# Patient Record
Sex: FEMALE | Employment: FULL TIME
[De-identification: names, ages, dates, MRNs, and addresses within clinical notes are randomized per-mention and may not be internally consistent; named-entity substitution may affect disease eponyms.]

---

## 2017-09-24 ENCOUNTER — HEALTH MAINTENANCE LETTER (OUTPATIENT)
Age: 32
End: 2017-09-24

## 2023-03-05 ASSESSMENT — ANXIETY QUESTIONNAIRES
2. NOT BEING ABLE TO STOP OR CONTROL WORRYING: MORE THAN HALF THE DAYS
8. IF YOU CHECKED OFF ANY PROBLEMS, HOW DIFFICULT HAVE THESE MADE IT FOR YOU TO DO YOUR WORK, TAKE CARE OF THINGS AT HOME, OR GET ALONG WITH OTHER PEOPLE?: VERY DIFFICULT
GAD7 TOTAL SCORE: 14
6. BECOMING EASILY ANNOYED OR IRRITABLE: MORE THAN HALF THE DAYS
5. BEING SO RESTLESS THAT IT IS HARD TO SIT STILL: SEVERAL DAYS
1. FEELING NERVOUS, ANXIOUS, OR ON EDGE: NEARLY EVERY DAY
4. TROUBLE RELAXING: MORE THAN HALF THE DAYS
7. FEELING AFRAID AS IF SOMETHING AWFUL MIGHT HAPPEN: MORE THAN HALF THE DAYS
GAD7 TOTAL SCORE: 14
IF YOU CHECKED OFF ANY PROBLEMS ON THIS QUESTIONNAIRE, HOW DIFFICULT HAVE THESE PROBLEMS MADE IT FOR YOU TO DO YOUR WORK, TAKE CARE OF THINGS AT HOME, OR GET ALONG WITH OTHER PEOPLE: VERY DIFFICULT
GAD7 TOTAL SCORE: 14
7. FEELING AFRAID AS IF SOMETHING AWFUL MIGHT HAPPEN: MORE THAN HALF THE DAYS
3. WORRYING TOO MUCH ABOUT DIFFERENT THINGS: MORE THAN HALF THE DAYS

## 2023-03-05 ASSESSMENT — PATIENT HEALTH QUESTIONNAIRE - PHQ9
SUM OF ALL RESPONSES TO PHQ QUESTIONS 1-9: 8
10. IF YOU CHECKED OFF ANY PROBLEMS, HOW DIFFICULT HAVE THESE PROBLEMS MADE IT FOR YOU TO DO YOUR WORK, TAKE CARE OF THINGS AT HOME, OR GET ALONG WITH OTHER PEOPLE: VERY DIFFICULT
SUM OF ALL RESPONSES TO PHQ QUESTIONS 1-9: 8

## 2023-03-06 ENCOUNTER — OFFICE VISIT (OUTPATIENT)
Dept: FAMILY MEDICINE | Facility: CLINIC | Age: 38
End: 2023-03-06
Payer: COMMERCIAL

## 2023-03-06 VITALS
WEIGHT: 158 LBS | OXYGEN SATURATION: 99 % | HEART RATE: 68 BPM | SYSTOLIC BLOOD PRESSURE: 139 MMHG | BODY MASS INDEX: 28 KG/M2 | TEMPERATURE: 97.9 F | HEIGHT: 63 IN | RESPIRATION RATE: 18 BRPM | DIASTOLIC BLOOD PRESSURE: 87 MMHG

## 2023-03-06 DIAGNOSIS — E55.9 VITAMIN D INSUFFICIENCY: ICD-10-CM

## 2023-03-06 DIAGNOSIS — F41.9 ANXIETY: Primary | ICD-10-CM

## 2023-03-06 DIAGNOSIS — F32.A DEPRESSION, UNSPECIFIED DEPRESSION TYPE: ICD-10-CM

## 2023-03-06 LAB — TSH SERPL DL<=0.005 MIU/L-ACNC: 3.03 MU/L (ref 0.4–4)

## 2023-03-06 PROCEDURE — 84443 ASSAY THYROID STIM HORMONE: CPT | Performed by: PHYSICIAN ASSISTANT

## 2023-03-06 PROCEDURE — 82306 VITAMIN D 25 HYDROXY: CPT | Performed by: PHYSICIAN ASSISTANT

## 2023-03-06 PROCEDURE — 36415 COLL VENOUS BLD VENIPUNCTURE: CPT | Performed by: PHYSICIAN ASSISTANT

## 2023-03-06 PROCEDURE — 99204 OFFICE O/P NEW MOD 45 MIN: CPT | Performed by: PHYSICIAN ASSISTANT

## 2023-03-06 RX ORDER — HYDROXYZINE HYDROCHLORIDE 25 MG/1
25-50 TABLET, FILM COATED ORAL 3 TIMES DAILY PRN
COMMUNITY
Start: 2023-02-16 | End: 2023-03-24

## 2023-03-06 RX ORDER — LEVONORGESTREL/ETHIN.ESTRADIOL 0.1-0.02MG
1 TABLET ORAL DAILY
COMMUNITY
Start: 2022-02-03

## 2023-03-06 RX ORDER — SERTRALINE HYDROCHLORIDE 100 MG/1
100 TABLET, FILM COATED ORAL DAILY
Qty: 30 TABLET | Refills: 0 | Status: SHIPPED | OUTPATIENT
Start: 2023-03-06 | End: 2023-04-07

## 2023-03-06 ASSESSMENT — ANXIETY QUESTIONNAIRES: GAD7 TOTAL SCORE: 14

## 2023-03-06 ASSESSMENT — ENCOUNTER SYMPTOMS: NERVOUS/ANXIOUS: 1

## 2023-03-06 ASSESSMENT — PAIN SCALES - GENERAL: PAINLEVEL: NO PAIN (0)

## 2023-03-06 ASSESSMENT — PATIENT HEALTH QUESTIONNAIRE - PHQ9
10. IF YOU CHECKED OFF ANY PROBLEMS, HOW DIFFICULT HAVE THESE PROBLEMS MADE IT FOR YOU TO DO YOUR WORK, TAKE CARE OF THINGS AT HOME, OR GET ALONG WITH OTHER PEOPLE: VERY DIFFICULT
SUM OF ALL RESPONSES TO PHQ QUESTIONS 1-9: 8

## 2023-03-06 NOTE — PROGRESS NOTES
Assessment & Plan     (F41.9) Anxiety  (primary encounter diagnosis)  Comment: Patient with history of anxiety.  Has been taking sertraline 50 mg over the last few weeks.  No significant nausea or vomiting associated with this.  Discussed with the patient increasing dose to 100 mg.  Patient was amenable to this.  In chart review it does appear that there is a history of vitamin D deficiency but this has not been assessed recently.  Discussed with patient screening labs for vitamin D deficiency.  Last thyroid study was 3 months ago was normal, however, the patient has been having worsening symptoms since that time.  Discussed with patient recheck of thyroid study.  Patient was amenable to this plan.  Patient does speak with a therapist.  I did provide her with a card for therapist here at the [facility].  She is going to continue with increasing dose of sertraline and follow-up in 30 days for repeat discussion of symptoms.  Plan: Vitamin D Deficiency, sertraline (ZOLOFT) 100         MG tablet, TSH with free T4 reflex           (F32.A) Depression, unspecified depression type  Comment: History of depression.  Has been taking sertraline.  Depression does seem to correlate more with the worsening anxiety.  Denies any suicidal or homicidal ideation.  Continue to monitor symptoms.    Return in about 4 weeks (around 4/3/2023) for Follow up.    Bashir Bermudez PA-C  [facility]   Marlin is a 37 year old, presenting for the following health issues:  Anxiety (Pt said that she has been dealing with anxiety and panic attack for the last two weeks. /)      Anxiety    History of Present Illness       Mental Health Follow-up:  Patient presents to follow-up on Depression & Anxiety.Patient's depression since last visit has been:  Medium  The patient is not having other symptoms associated with depression.  Patient's anxiety since last visit has been:  Medium  The patient is having other  symptoms associated with anxiety.  Any significant life events: relationship concerns, financial concerns and health concerns  Patient is feeling anxious or having panic attacks.  Patient has no concerns about alcohol or drug use.    She eats 0-1 servings of fruits and vegetables daily.She consumes 1 sweetened beverage(s) daily.She exercises with enough effort to increase her heart rate 9 or less minutes per day.  She exercises with enough effort to increase her heart rate 3 or less days per week.   She is taking medications regularly.    Today's PHQ-9         PHQ-9 Total Score: 8    PHQ-9 Q9 Thoughts of better off dead/self-harm past 2 weeks :   Not at all    How difficult have these problems made it for you to do your work, take care of things at home, or get along with other people: Very difficult  Today's RUTH ANN-7 Score: 14     Starting roughly 6 months ago the patient began having issues with nausea.  This became more constant over the last few weeks.  Felt as though she is having a panic attack.  She was seen at [facility] emergency department.  She is also followed with gastroenterology.  Work-ups have included endoscopy, EKG, CT scans.  She feels at times as though she is short of breath needs to vomit.  With this has been having racing heart.  Started on hydroxyzine 25 mg 2-3 times per day.  This has improved her symptoms when having more severe flares.  Was also started on sertraline 50 mg once daily.  She has some depression related to the increased anxiety and does not feel as though she can go to work with this.  She has not had any thoughts of self-harm.  She has been having more issues with sleep over the last 2 months.  Troubles falling asleep as well as staying asleep.  Does speak with a therapist monthly.  Patient does not correlate symptoms to any new or significant triggers at work.  Patient does not voice any other new complaints or concerns         Review of Systems   Psychiatric/Behavioral: The  "patient is nervous/anxious.       Constitutional, HEENT, cardiovascular, pulmonary, gi and gu systems are negative, except as otherwise noted.      Objective    /87   Pulse 68   Temp 97.9  F (36.6  C) (Tympanic)   Resp 18   Ht 1.6 m (5' 3\")   Wt 71.7 kg (158 lb)   SpO2 99%   BMI 27.99 kg/m    Body mass index is 27.99 kg/m .  Physical Exam   GENERAL: healthy, alert and no distress  NECK: no adenopathy, no asymmetry, masses, or scars and thyroid normal to palpation  RESP: lungs clear to auscultation - no rales, rhonchi or wheezes  CV: regular rate and rhythm, normal S1 S2, no S3 or S4, no murmur, click or rub, no peripheral edema and peripheral pulses strong  ABDOMEN: soft, nontender, no hepatosplenomegaly, no masses and bowel sounds normal  MS: no gross musculoskeletal defects noted, no edema  SKIN: no suspicious lesions or rashes  NEURO: Normal strength and tone, mentation intact and speech normal  PSYCH: mentation appears normal, affect normal/bright          "

## 2023-03-07 LAB — DEPRECATED CALCIDIOL+CALCIFEROL SERPL-MC: 20 UG/L (ref 20–75)

## 2023-03-07 RX ORDER — VITAMIN B COMPLEX
1 TABLET ORAL DAILY
Qty: 90 TABLET | Refills: 1 | Status: SHIPPED | OUTPATIENT
Start: 2023-03-07 | End: 2023-09-03

## 2023-04-06 ASSESSMENT — ANXIETY QUESTIONNAIRES
3. WORRYING TOO MUCH ABOUT DIFFERENT THINGS: SEVERAL DAYS
IF YOU CHECKED OFF ANY PROBLEMS ON THIS QUESTIONNAIRE, HOW DIFFICULT HAVE THESE PROBLEMS MADE IT FOR YOU TO DO YOUR WORK, TAKE CARE OF THINGS AT HOME, OR GET ALONG WITH OTHER PEOPLE: NOT DIFFICULT AT ALL
6. BECOMING EASILY ANNOYED OR IRRITABLE: NOT AT ALL
1. FEELING NERVOUS, ANXIOUS, OR ON EDGE: SEVERAL DAYS
7. FEELING AFRAID AS IF SOMETHING AWFUL MIGHT HAPPEN: NOT AT ALL
8. IF YOU CHECKED OFF ANY PROBLEMS, HOW DIFFICULT HAVE THESE MADE IT FOR YOU TO DO YOUR WORK, TAKE CARE OF THINGS AT HOME, OR GET ALONG WITH OTHER PEOPLE?: NOT DIFFICULT AT ALL
GAD7 TOTAL SCORE: 3
5. BEING SO RESTLESS THAT IT IS HARD TO SIT STILL: NOT AT ALL
7. FEELING AFRAID AS IF SOMETHING AWFUL MIGHT HAPPEN: NOT AT ALL
4. TROUBLE RELAXING: NOT AT ALL
GAD7 TOTAL SCORE: 3
GAD7 TOTAL SCORE: 3
2. NOT BEING ABLE TO STOP OR CONTROL WORRYING: SEVERAL DAYS

## 2023-04-06 ASSESSMENT — PATIENT HEALTH QUESTIONNAIRE - PHQ9
SUM OF ALL RESPONSES TO PHQ QUESTIONS 1-9: 3
10. IF YOU CHECKED OFF ANY PROBLEMS, HOW DIFFICULT HAVE THESE PROBLEMS MADE IT FOR YOU TO DO YOUR WORK, TAKE CARE OF THINGS AT HOME, OR GET ALONG WITH OTHER PEOPLE: NOT DIFFICULT AT ALL
SUM OF ALL RESPONSES TO PHQ QUESTIONS 1-9: 3

## 2023-04-07 ENCOUNTER — OFFICE VISIT (OUTPATIENT)
Dept: FAMILY MEDICINE | Facility: CLINIC | Age: 38
End: 2023-04-07
Payer: COMMERCIAL

## 2023-04-07 VITALS
HEART RATE: 71 BPM | SYSTOLIC BLOOD PRESSURE: 137 MMHG | TEMPERATURE: 98.1 F | WEIGHT: 261.2 LBS | OXYGEN SATURATION: 97 % | DIASTOLIC BLOOD PRESSURE: 84 MMHG | RESPIRATION RATE: 16 BRPM | BODY MASS INDEX: 46.28 KG/M2 | HEIGHT: 63 IN

## 2023-04-07 DIAGNOSIS — F41.9 ANXIETY: Primary | ICD-10-CM

## 2023-04-07 PROBLEM — E66.01 MORBID OBESITY (H): Status: ACTIVE | Noted: 2023-04-07

## 2023-04-07 PROCEDURE — 99213 OFFICE O/P EST LOW 20 MIN: CPT | Performed by: PHYSICIAN ASSISTANT

## 2023-04-07 RX ORDER — HYDROXYZINE HYDROCHLORIDE 25 MG/1
25-50 TABLET, FILM COATED ORAL 3 TIMES DAILY PRN
Qty: 60 TABLET | Refills: 0 | Status: SHIPPED | OUTPATIENT
Start: 2023-04-07 | End: 2023-08-18

## 2023-04-07 RX ORDER — SERTRALINE HYDROCHLORIDE 100 MG/1
100 TABLET, FILM COATED ORAL DAILY
Qty: 90 TABLET | Refills: 1 | Status: SHIPPED | OUTPATIENT
Start: 2023-04-07 | End: 2023-08-18

## 2023-04-07 ASSESSMENT — PAIN SCALES - GENERAL: PAINLEVEL: MILD PAIN (2)

## 2023-04-07 ASSESSMENT — PATIENT HEALTH QUESTIONNAIRE - PHQ9
SUM OF ALL RESPONSES TO PHQ QUESTIONS 1-9: 3
10. IF YOU CHECKED OFF ANY PROBLEMS, HOW DIFFICULT HAVE THESE PROBLEMS MADE IT FOR YOU TO DO YOUR WORK, TAKE CARE OF THINGS AT HOME, OR GET ALONG WITH OTHER PEOPLE: NOT DIFFICULT AT ALL

## 2023-04-07 ASSESSMENT — ANXIETY QUESTIONNAIRES: GAD7 TOTAL SCORE: 3

## 2023-04-07 NOTE — PROGRESS NOTES
Assessment & Plan     (F41.9) Anxiety  (primary encounter diagnosis)  Comment: Patient presents for recheck of medications.  Had increase of sertraline from 50 mg to 100 mg.  Mood has stabilized over the last week.  Not using hydroxyzine daily as she had been previously.  Continues to follow with therapist.  No thoughts of self-harm.  Patient feels that this dose has been holding her better for anxiety.  Discussed with the patient starting exercise regimen that may also be beneficial for mental health.  She is currently on vitamin D supplements.  Continue to take these and plan to recheck vitamin D level in the next few months.  Should she have any worsening or new concerns she will be seen again  Plan: sertraline (ZOLOFT) 100 MG tablet, hydrOXYzine         (ATARAX) 25 MG tablet           Bashir Bermudez PA-C  [facility]   Marlin is a 37 year old, presenting for the following health issues:  Follow Up (F/U from last visit. )         View : No data to display.              History of Present Illness       Mental Health Follow-up:  Patient presents to follow-up on Depression & Anxiety.Patient's depression since last visit has been:  Better  The patient is not having other symptoms associated with depression.  Patient's anxiety since last visit has been:  Better  The patient is not having other symptoms associated with anxiety.  Any significant life events: relationship concerns and financial concerns  Patient is feeling anxious or having panic attacks.  Patient has no concerns about alcohol or drug use.    She eats 0-1 servings of fruits and vegetables daily.She consumes 0 sweetened beverage(s) daily.She exercises with enough effort to increase her heart rate 9 or less minutes per day.  She exercises with enough effort to increase her heart rate 3 or less days per week.   She is taking medications regularly.    Today's PHQ-9         PHQ-9 Total Score: 3    PHQ-9 Q9 Thoughts  "of better off dead/self-harm past 2 weeks :   Not at all    How difficult have these problems made it for you to do your work, take care of things at home, or get along with other people: Not difficult at all  Today's RUTH ANN-7 Score: 3    1 month ago patient had increase of sertraline from 50 mg to 100 mg.  Over the last week she has had significant improvement in her overall anxiety.  She has been using hydroxyzine anywhere from 1-3 times a day.  Has not used it over the last week.  Feels her mood is overall more stable.  No thoughts of self-harm.  Continues to see therapist through relationship therapy center.  Initially therapy there had started out as couples therapy and became more individualized.  No current exercise regimen.  Has not been working out regularly.        Review of Systems   Constitutional, HEENT, cardiovascular, pulmonary, gi and gu systems are negative, except as otherwise noted.      Objective    /84   Pulse 71   Temp 98.1  F (36.7  C) (Tympanic)   Resp 16   Ht 1.6 m (5' 3\")   Wt 118.5 kg (261 lb 3.2 oz)   SpO2 97%   BMI 46.27 kg/m    Body mass index is 46.27 kg/m .  Physical Exam   GENERAL: healthy, alert and no distress  EYES: Eyes grossly normal to inspection, PERRL and conjunctivae and sclerae normal  HENT: normal cephalic/atraumatic, nose and mouth without ulcers or lesions, oropharynx clear and oral mucous membranes moist  RESP: lungs clear to auscultation - no rales, rhonchi or wheezes  CV: regular rate and rhythm, normal S1 S2, no S3 or S4, no murmur, click or rub,    MS: no gross musculoskeletal defects noted, no edema  PSYCH: mentation appears normal, affect normal/bright          "

## 2023-04-23 ENCOUNTER — HEALTH MAINTENANCE LETTER (OUTPATIENT)
Age: 38
End: 2023-04-23

## 2023-08-18 ENCOUNTER — E-VISIT (OUTPATIENT)
Dept: FAMILY MEDICINE | Facility: CLINIC | Age: 38
End: 2023-08-18
Payer: COMMERCIAL

## 2023-08-18 DIAGNOSIS — F41.9 ANXIETY: ICD-10-CM

## 2023-08-18 PROCEDURE — 99421 OL DIG E/M SVC 5-10 MIN: CPT | Performed by: PHYSICIAN ASSISTANT

## 2023-08-18 RX ORDER — HYDROXYZINE HYDROCHLORIDE 25 MG/1
25-50 TABLET, FILM COATED ORAL 3 TIMES DAILY PRN
Qty: 60 TABLET | Refills: 0 | Status: SHIPPED | OUTPATIENT
Start: 2023-08-18 | End: 2023-11-17

## 2023-08-18 RX ORDER — SERTRALINE HYDROCHLORIDE 100 MG/1
100 TABLET, FILM COATED ORAL DAILY
Qty: 90 TABLET | Refills: 0 | Status: SHIPPED | OUTPATIENT
Start: 2023-08-18 | End: 2023-11-17

## 2023-08-18 NOTE — TELEPHONE ENCOUNTER
Refill of sertraline and hydroxyzine.   Symptoms have been stable.   Provider E-Visit time total (minutes): 7   Bashir Bermudez PA-C

## 2023-08-18 NOTE — PATIENT INSTRUCTIONS
Thank you for choosing us for your care. I have placed an order for a prescription so that you can start treatment. View your full visit summary for details by clicking on the link below. Your pharmacist will able to address any questions you may have about the medication.     If you're not feeling better within 5-7 days, please schedule an appointment.  You can schedule an appointment right here in [facility], or call [phone]  If the visit is for the same symptoms as your eVisit, we'll refund the cost of your eVisit if seen within seven days.

## 2023-11-17 ENCOUNTER — VIRTUAL VISIT (OUTPATIENT)
Dept: FAMILY MEDICINE | Facility: CLINIC | Age: 38
End: 2023-11-17
Payer: COMMERCIAL

## 2023-11-17 DIAGNOSIS — F41.9 ANXIETY: ICD-10-CM

## 2023-11-17 PROCEDURE — 99213 OFFICE O/P EST LOW 20 MIN: CPT | Mod: 95 | Performed by: PHYSICIAN ASSISTANT

## 2023-11-17 PROCEDURE — 96127 BRIEF EMOTIONAL/BEHAV ASSMT: CPT | Mod: 95 | Performed by: PHYSICIAN ASSISTANT

## 2023-11-17 RX ORDER — HYDROXYZINE HYDROCHLORIDE 25 MG/1
25-50 TABLET, FILM COATED ORAL 3 TIMES DAILY PRN
Qty: 60 TABLET | Refills: 3 | Status: SHIPPED | OUTPATIENT
Start: 2023-11-17

## 2023-11-17 RX ORDER — SERTRALINE HYDROCHLORIDE 100 MG/1
100 TABLET, FILM COATED ORAL DAILY
Qty: 90 TABLET | Refills: 3 | Status: SHIPPED | OUTPATIENT
Start: 2023-11-17

## 2023-11-17 ASSESSMENT — ANXIETY QUESTIONNAIRES
GAD7 TOTAL SCORE: 1
5. BEING SO RESTLESS THAT IT IS HARD TO SIT STILL: NOT AT ALL
7. FEELING AFRAID AS IF SOMETHING AWFUL MIGHT HAPPEN: NOT AT ALL
3. WORRYING TOO MUCH ABOUT DIFFERENT THINGS: NOT AT ALL
1. FEELING NERVOUS, ANXIOUS, OR ON EDGE: NOT AT ALL
4. TROUBLE RELAXING: NOT AT ALL
2. NOT BEING ABLE TO STOP OR CONTROL WORRYING: SEVERAL DAYS
6. BECOMING EASILY ANNOYED OR IRRITABLE: NOT AT ALL
IF YOU CHECKED OFF ANY PROBLEMS ON THIS QUESTIONNAIRE, HOW DIFFICULT HAVE THESE PROBLEMS MADE IT FOR YOU TO DO YOUR WORK, TAKE CARE OF THINGS AT HOME, OR GET ALONG WITH OTHER PEOPLE: NOT DIFFICULT AT ALL
GAD7 TOTAL SCORE: 1

## 2023-11-17 ASSESSMENT — PATIENT HEALTH QUESTIONNAIRE - PHQ9: SUM OF ALL RESPONSES TO PHQ QUESTIONS 1-9: 0

## 2023-11-17 NOTE — PROGRESS NOTES
"    Instructions Relayed to Patient by Virtual Roomer:     Patient is active on [employer]:   Relayed following to patient: \"It looks like you are active on [employer], are you able to join the visit this way? If not, do you need us to send you a link now or would you like your provider to send a link via text or email when they are ready to initiate the visit?\"    Reminded patient to ensure they were logged on to virtual visit by arrival time listed. Documented in appointment notes if patient had flexibility to initiate visit sooner than arrival time. If pediatric virtual visit, ensured pediatric patient along with parent/guardian will be present for video visit.     Patient offered the website [email or web address] and/or phone number to [employer] Help line: [phone]   Marlin is a 38 year old who is being evaluated via a billable video visit.      How would you like to obtain your AVS? [employer]  If the video visit is dropped, the invitation should be resent by: Text to cell phone: [phone]  Will anyone else be joining your video visit? No          Assessment & Plan     (F41.9) Anxiety  Comment: Patient presents for refills of medications.  On sertraline 100 mg as well as hydroxyzine as needed.  Overall symptoms have been stable.  Denies any alcohol or other substance abuse.  Would like refills of medications.  Discussed if she has any worsening or new concerns to be seen again.  Discussed potentially establishing with a new therapist.  Plan: hydrOXYzine (ATARAX) 25 MG tablet, sertraline         (ZOLOFT) 100 MG tablet                     BMI:   Estimated body mass index is 46.27 kg/m  as calculated from the following:    Height as of 4/7/23: 1.6 m (5' 3\").    Weight as of 4/7/23: 118.5 kg (261 lb 3.2 oz).           Bashir Bermudez PA-C  [facility]   Marlin is a 38 year old, presenting for the following health issues:  No chief complaint on file.      History " of Present Illness       Mental Health Follow-up:  Patient presents to follow-up on Depression & Anxiety.Patient's depression since last visit has been:  Good  The patient is not having other symptoms associated with depression.  Patient's anxiety since last visit has been:  Better  The patient is not having other symptoms associated with anxiety.  Any significant life events: relationship concerns and financial concerns  Patient is not feeling anxious or having panic attacks.  Patient has no concerns about alcohol or drug use.    She eats 0-1 servings of fruits and vegetables daily.She consumes 1 sweetened beverage(s) daily.She exercises with enough effort to increase her heart rate 9 or less minutes per day.  She exercises with enough effort to increase her heart rate 3 or less days per week.   She is taking medications regularly.       Patient has been feeling much improved with the 100 mg sertraline.  No thoughts of self-harm.  Has been holding her symptoms well.  Denies any thoughts of self-harm.  Previously had been following with a therapist, however, this therapist had a change in her schedule she has not seen them for the last few months.  Has been using hydroxyzine once every few weeks.  Wise patient has been doing well.    Review of Systems   Constitutional, HEENT, cardiovascular, pulmonary, gi and gu systems are negative, except as otherwise noted.      Objective           Vitals:  No vitals were obtained today due to virtual visit.    Physical Exam   GENERAL: Healthy, alert and no distress  EYES: Eyes grossly normal to inspection.  No discharge or erythema, or obvious scleral/conjunctival abnormalities.  RESP: No audible wheeze, cough, or visible cyanosis.  No visible retractions or increased work of breathing.    SKIN: Visible skin clear. No significant rash, abnormal pigmentation or lesions.  NEURO: Cranial nerves grossly intact.  Mentation and speech appropriate for age.  PSYCH: Mentation appears  normal, affect normal/bright, judgement and insight intact, normal speech and appearance well-groomed.                Video-Visit Details    Type of service:  Video Visit     Originating Location (pt. Location): Home    Distant Location (provider location):  On-site  Platform used for Video Visit: Cari

## 2024-01-18 ENCOUNTER — OFFICE VISIT (OUTPATIENT)
Dept: FAMILY MEDICINE | Facility: CLINIC | Age: 39
End: 2024-01-18
Payer: COMMERCIAL

## 2024-01-18 VITALS
BODY MASS INDEX: 48.9 KG/M2 | DIASTOLIC BLOOD PRESSURE: 88 MMHG | SYSTOLIC BLOOD PRESSURE: 139 MMHG | WEIGHT: 276 LBS | HEIGHT: 63 IN | RESPIRATION RATE: 12 BRPM | TEMPERATURE: 97.5 F | OXYGEN SATURATION: 96 % | HEART RATE: 77 BPM

## 2024-01-18 DIAGNOSIS — R19.8 EPISODE OF GAGGING: Primary | ICD-10-CM

## 2024-01-18 DIAGNOSIS — E66.01 MORBID OBESITY (H): ICD-10-CM

## 2024-01-18 PROCEDURE — 99213 OFFICE O/P EST LOW 20 MIN: CPT | Performed by: PHYSICIAN ASSISTANT

## 2024-01-18 NOTE — PROGRESS NOTES
Assessment & Plan  appears well, ENT referral for further work up ( stroboscopy?) and consideration of SLP referral.    Problem List Items Addressed This Visit          Digestive    Morbid obesity (H)     Other Visit Diagnoses       Episode of gagging    -  Primary    Relevant Orders    Adult ENT  Referral               14 minutes spent by me on the date of the encounter doing chart review, history and exam, documentation and further activities per the note          Subjective   Marlin is a 38 year old, presenting for the following health issues:  Throat Problem        1/18/2024     9:44 AM   Additional Questions   Roomed by Omi     History of Present Illness       Reason for visit:  Spontaneous gag problem    She eats 0-1 servings of fruits and vegetables daily.She consumes 1 sweetened beverage(s) daily.She exercises with enough effort to increase her heart rate 9 or less minutes per day.  She exercises with enough effort to increase her heart rate 3 or less days per week.   She is taking medications regularly.         Concern - Gag issues  Onset: on and off for a year  Description: feels like she is going to vomit, she has had testing in the past.  Intensity: moderate  Progression of Symptoms:  improving and intermittent  Accompanying Signs & Symptoms: nausea  Previous history of similar problem: yes.   Precipitating factors:        Worsened by: unknown. Seems to happen more when standing  Alleviating factors:        Improved by: Laying down, sometimes cold water. Chiro seems to help,   Therapies tried and outcome: antacid, and treated for anxiety as a possible dx/ treated for anxiety but still having sx.    Happening less frequently than in the past,  can be daily, can be just  a couple times a week, no abd pain,   no salivation, no nausea, just feels like she has to gag, not assoc with eating,  appears random,  this does not seem to be assoc or feel like prior hx heartburn,  saw [facility] last year,  "had endoscopy and bx, thought 2nd to anxiety and has been better since being on sertraline but not resovled, not clearly associated with mood or anxiety,   wants to see ENT.  Can happen when she is talking but not clearly associ with that.  Denies PND, has been more congested than usual  recently but gagging is generally diminished lately.   Hasn't needed hydroxyzine recently.                 Objective    /88 (BP Location: Left arm, Patient Position: Sitting, Cuff Size: Adult Large)   Pulse 77   Temp 97.5  F (36.4  C) (Oral)   Resp 12   Ht 1.6 m (5' 3\")   Wt 125.2 kg (276 lb)   SpO2 96%   BMI 48.89 kg/m    Body mass index is 48.89 kg/m .    Physical Exam   GENERAL: alert and no distress  HENT: normal cephalic/atraumatic, nose and mouth without ulcers or lesions, oropharynx clear, and oral mucous membranes moist  NECK: no adenopathy, no asymmetry, masses, or scars            Signed Electronically by: VERONICA Mcmahon    "
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
## 2024-04-20 ENCOUNTER — HEALTH MAINTENANCE LETTER (OUTPATIENT)
Age: 39
End: 2024-04-20
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
## 2024-08-08 ENCOUNTER — OFFICE VISIT (OUTPATIENT)
Dept: OTOLARYNGOLOGY | Facility: CLINIC | Age: 39
End: 2024-08-08
Payer: COMMERCIAL

## 2024-08-08 VITALS — SYSTOLIC BLOOD PRESSURE: 123 MMHG | DIASTOLIC BLOOD PRESSURE: 89 MMHG | HEART RATE: 71 BPM | OXYGEN SATURATION: 100 %

## 2024-08-08 DIAGNOSIS — R19.8 EPISODE OF GAGGING: Primary | ICD-10-CM

## 2024-08-08 DIAGNOSIS — K21.9 LPRD (LARYNGOPHARYNGEAL REFLUX DISEASE): ICD-10-CM

## 2024-08-08 PROCEDURE — 99243 OFF/OP CNSLTJ NEW/EST LOW 30: CPT | Performed by: OTOLARYNGOLOGY

## 2024-08-08 NOTE — LETTER
8/8/2024      Marlin Cabral  [address] [facility] [address]      Dear Colleague,    Thank you for referring your patient, Marlin Cabral, to the [facility]. Please see a copy of my visit note below.    I am seeing this patient in consultation for episode of gagging at the request of the provider Addy Azevedo      Chief Complaint - episodes of gagging    History of Present Illness - Marlin Cabral is a 38 year old female who has episodes of gagging. It can happen 2 times a month on average. It can happen a few times in one day. In between episodes she feels fine. In the moment she feels she may throw up, but doesn't. It is random, and passes. She does get reflux. She tried nexium for 2 weeks. It was less frequent. Voicing has seemed normal. When it doesn't happen she angi dysphagia, odynaphagia. I personally reviewed Keshawn Azevedo clinical notes in [facility]. She saw GI, had EGD and that was normal. She clears throat more.     She gets sinus pressure every 3-4 months. She denies discolored drainage.     Tests personally reviewed today for this visit:   1.) CT head 2/10/23   -  No acute intracranial process.   -  Chronic right maxillary sinusitis.   2.) 6/20/24 hgb A1c was 5.7.    Past Medical History -   Patient Active Problem List   Diagnosis     CARDIOVASCULAR SCREENING; LDL GOAL LESS THAN 160     Morbid obesity (H)       Current Medications -   Current Outpatient Medications:      hydrOXYzine (ATARAX) 25 MG tablet, Take 1-2 tablets (25-50 mg) by mouth 3 times daily as needed for anxiety, Disp: 60 tablet, Rfl: 3     levonorgestrel-ethinyl estradiol (AVIANE) 0.1-20 MG-MCG tablet, Take 1 tablet by mouth daily, Disp: , Rfl:      sertraline (ZOLOFT) 100 MG tablet, Take 1 tablet (100 mg) by mouth daily, Disp: 90 tablet, Rfl: 3    Allergies - No Known Allergies    Social History -   Social History     Socioeconomic History     Marital status: Single   Tobacco Use     Smoking status:  Former     Types: Cigarettes     Smokeless tobacco: Never   Vaping Use     Vaping status: Never Used   Substance and Sexual Activity     Alcohol use: Not Currently     Drug use: Never     Sexual activity: Not Currently     Partners: Male     Birth control/protection: Abstinence, Pill   Other Topics Concern     Parent/sibling w/ CABG, MI or angioplasty before 65F 55M? No     Social Determinants of Health     Financial Resource Strain: Low Risk  (1/17/2024)    Financial Resource Strain      Within the past 12 months, have you or your family members you live with been unable to get utilities (heat, electricity) when it was really needed?: No   Food Insecurity: Low Risk  (1/17/2024)    Food Insecurity      Within the past 12 months, did you worry that your food would run out before you got money to buy more?: No      Within the past 12 months, did the food you bought just not last and you didn t have money to get more?: No   Transportation Needs: Low Risk  (1/17/2024)    Transportation Needs      Within the past 12 months, has lack of transportation kept you from medical appointments, getting your medicines, non-medical meetings or appointments, work, or from getting things that you need?: No   Interpersonal Safety: High Risk (1/18/2024)    Interpersonal Safety      Do you feel physically and emotionally safe where you currently live?: Yes      Within the past 12 months, have you been hit, slapped, kicked or otherwise physically hurt by someone?: No      Within the past 12 months, have you been humiliated or emotionally abused in other ways by your partner or ex-partner?: Yes   Housing Stability: Low Risk  (1/17/2024)    Housing Stability      Do you have housing? : Yes      Are you worried about losing your housing?: No       Family History -   Family History   Problem Relation Age of Onset     Hypertension Father      Depression Father      Anxiety Disorder Father      Obesity Father      Colon Cancer Paternal  Grandfather      Breast Cancer Paternal Grandmother      Obesity Paternal Grandmother      Hypertension Brother      Obesity Brother      Obesity Sister        Review of Systems - As per HPI and PMHx, otherwise 7 system review of the head and neck is negative.    Physical Exam  General - The patient is in no distress. Alert, answers questions and cooperates with examination appropriately.   Voice and Breathing - The patient was breathing comfortably without the use of accessory muscles. There was no wheezing, stridor, or stertor.  The patients voice was clear and strong.  Eyes - Extraocular movements intact.  Sclera were not icteric or injected, conjunctiva were pink and moist.  Mouth - Examination of the oral cavity showed pink, healthy oral mucosa. No lesions or ulcerations noted.  The tongue was mobile and midline.  Throat - The walls of the oropharynx were smooth, symmetric, and had no lesions or ulcerations.  The tonsillar pillars and soft palate were symmetric.  The uvula was midline on elevation.   Nose - External contour is symmetric, no gross deflection or scars.  Nasal mucosa is pink and moist with no abnormal mucus.  The septum was midline and non-obstructive, turbinates of normal size and position.  No polyps, masses, or purulence noted on examination.  Neck -  Soft, non-tender. Palpation of the occipital, submental, submandibular, internal jugular chain, and supraclavicular nodes did not demonstrate any abnormal lymph nodes or masses. No parotid masses. Palpation of the thyroid was soft and smooth, with no nodules or goiter appreciated.  The trachea was mobile and midline.  Neurologic - CN II-XII are grossly intact, no focal neurologic deficits.         A/P -     ICD-10-CM    1. Episode of gagging  R19.8 Adult ENT  Referral     esomeprazole (NEXIUM) 20 MG DR capsule      2. LPRD (laryngopharyngeal reflux disease)  K21.9 esomeprazole (NEXIUM) 20 MG DR capsule          Marlin Cabral is a 38  year old female with intermittent gagging episodes. No apparent triggers. Happens a few times a month. She does get reflux sometimes and is obese. I want to try her on nexium for 3 months for silent laryngopharyngeal reflux. Return then. If this fails, can consider laryngoscopy and/or other treatments.       Dragan Cameron MD  Otolaryngology  [facility]      Again, thank you for allowing me to participate in the care of your patient.        Sincerely,        Dragan Cameron MD

## 2024-08-14 ENCOUNTER — PATIENT OUTREACH (OUTPATIENT)
Dept: CARE COORDINATION | Facility: CLINIC | Age: 39
End: 2024-08-14
Payer: COMMERCIAL

## 2024-09-17 ENCOUNTER — PATIENT OUTREACH (OUTPATIENT)
Dept: CARE COORDINATION | Facility: CLINIC | Age: 39
End: 2024-09-17
Payer: COMMERCIAL

## 2024-10-31 ENCOUNTER — ANCILLARY PROCEDURE (OUTPATIENT)
Dept: MAMMOGRAPHY | Facility: CLINIC | Age: 39
End: 2024-10-31
Attending: FAMILY MEDICINE
Payer: COMMERCIAL

## 2024-10-31 DIAGNOSIS — Z12.31 VISIT FOR SCREENING MAMMOGRAM: ICD-10-CM

## 2024-10-31 PROCEDURE — 77067 SCR MAMMO BI INCL CAD: CPT | Mod: TC | Performed by: STUDENT IN AN ORGANIZED HEALTH CARE EDUCATION/TRAINING PROGRAM

## 2024-10-31 PROCEDURE — 77063 BREAST TOMOSYNTHESIS BI: CPT | Mod: TC | Performed by: STUDENT IN AN ORGANIZED HEALTH CARE EDUCATION/TRAINING PROGRAM

## 2024-11-14 ENCOUNTER — OFFICE VISIT (OUTPATIENT)
Dept: OTOLARYNGOLOGY | Facility: CLINIC | Age: 39
End: 2024-11-14
Payer: COMMERCIAL

## 2024-11-14 VITALS
OXYGEN SATURATION: 98 % | SYSTOLIC BLOOD PRESSURE: 135 MMHG | HEART RATE: 82 BPM | RESPIRATION RATE: 16 BRPM | DIASTOLIC BLOOD PRESSURE: 85 MMHG

## 2024-11-14 DIAGNOSIS — K21.9 LPRD (LARYNGOPHARYNGEAL REFLUX DISEASE): ICD-10-CM

## 2024-11-14 DIAGNOSIS — R19.8 EPISODE OF GAGGING: Primary | ICD-10-CM

## 2024-11-14 PROCEDURE — 99213 OFFICE O/P EST LOW 20 MIN: CPT | Performed by: OTOLARYNGOLOGY

## 2024-11-14 NOTE — LETTER
11/14/2024      Marlin Cabral  [address] [facility] [address]      Dear Colleague,    Thank you for referring your patient, Marlin Cabral, to the [facility]. Please see a copy of my visit note below.    Chief Complaint - episodes of gagging    History of Present Illness - Marlin Cabral is a 39 year old female who has episodes of gagging. It can happen 2 times a month on average. It can happen a few times in one day. In between episodes she feels fine. In the moment she feels she may throw up, but doesn't. It is random, and passes. She does get reflux. She tried nexium for 2 weeks. It was less frequent. Voicing has seemed normal. When it doesn't happen she angi dysphagia, odynaphagia. I personally reviewed Keshawn Azevedo clinical notes in [facility]. She saw GI, had EGD (2/2024) and that was normal. She clears throat more.     She gets sinus pressure every 3-4 months. She denies discolored drainage.     I had her try nexium. She returns and notes she is much better. Gagging went from daily to 1-2 times a week. She stopped ibuprofen and that helped a lot too. She eats better.     Tests personally reviewed today for this visit:   1.) CT head 2/10/23   -  No acute intracranial process.   -  Chronic right maxillary sinusitis.   2.) 6/20/24 hgb A1c was 5.7.    Past Medical History -   Patient Active Problem List   Diagnosis     CARDIOVASCULAR SCREENING; LDL GOAL LESS THAN 160     Morbid obesity (H)       Current Medications -   Current Outpatient Medications:      esomeprazole (NEXIUM) 20 MG DR capsule, Take 1 capsule (20 mg) by mouth every morning (before breakfast) Take 30-60 minutes before eating., Disp: 30 capsule, Rfl: 5     hydrOXYzine (ATARAX) 25 MG tablet, Take 1-2 tablets (25-50 mg) by mouth 3 times daily as needed for anxiety, Disp: 60 tablet, Rfl: 3     levonorgestrel-ethinyl estradiol (AVIANE) 0.1-20 MG-MCG tablet, Take 1 tablet by mouth daily, Disp: , Rfl:      sertraline  (ZOLOFT) 100 MG tablet, Take 1 tablet (100 mg) by mouth daily, Disp: 90 tablet, Rfl: 3    Allergies - No Known Allergies    Social History -   Social History     Socioeconomic History     Marital status: Single   Tobacco Use     Smoking status: Former     Types: Cigarettes     Smokeless tobacco: Never   Vaping Use     Vaping status: Never Used   Substance and Sexual Activity     Alcohol use: Not Currently     Drug use: Never     Sexual activity: Not Currently     Partners: Male     Birth control/protection: Abstinence, Pill   Other Topics Concern     Parent/sibling w/ CABG, MI or angioplasty before 65F 55M? No     Social Determinants of Health     Financial Resource Strain: Low Risk  (1/17/2024)    Financial Resource Strain      Within the past 12 months, have you or your family members you live with been unable to get utilities (heat, electricity) when it was really needed?: No   Food Insecurity: Low Risk  (1/17/2024)    Food Insecurity      Within the past 12 months, did you worry that your food would run out before you got money to buy more?: No      Within the past 12 months, did the food you bought just not last and you didn t have money to get more?: No   Transportation Needs: Low Risk  (1/17/2024)    Transportation Needs      Within the past 12 months, has lack of transportation kept you from medical appointments, getting your medicines, non-medical meetings or appointments, work, or from getting things that you need?: No   Interpersonal Safety: High Risk (1/18/2024)    Interpersonal Safety      Do you feel physically and emotionally safe where you currently live?: Yes      Within the past 12 months, have you been hit, slapped, kicked or otherwise physically hurt by someone?: No      Within the past 12 months, have you been humiliated or emotionally abused in other ways by your partner or ex-partner?: Yes   Housing Stability: Low Risk  (1/17/2024)    Housing Stability      Do you have housing? : Yes      Are  you worried about losing your housing?: No       Family History -   Family History   Problem Relation Age of Onset     Hypertension Father      Depression Father      Anxiety Disorder Father      Obesity Father      Colon Cancer Paternal Grandfather      Breast Cancer Paternal Grandmother      Obesity Paternal Grandmother      Hypertension Brother      Obesity Brother      Obesity Sister      Physical Exam  General - The patient is in no distress. Alert, answers questions and cooperates with examination appropriately.   Voice and Breathing - The patient was breathing comfortably without the use of accessory muscles. There was no wheezing, stridor, or stertor.  The patients voice was clear and strong.  Eyes - Extraocular movements intact.  Sclera were not icteric or injected, conjunctiva were pink and moist.  Mouth - Examination of the oral cavity showed pink, healthy oral mucosa. No lesions or ulcerations noted.  The tongue was mobile and midline.  Throat - The walls of the oropharynx were smooth, symmetric, and had no lesions or ulcerations.  The tonsillar pillars and soft palate were symmetric.  The uvula was midline on elevation.   Nose - External contour is symmetric, no gross deflection or scars.  Nasal mucosa is pink and moist with no abnormal mucus.  The septum was midline and non-obstructive, turbinates of normal size and position.  No polyps, masses, or purulence noted on examination.  Neck -  Soft, non-tender. Palpation of the occipital, submental, submandibular, internal jugular chain, and supraclavicular nodes did not demonstrate any abnormal lymph nodes or masses. No parotid masses. Palpation of the thyroid was soft and smooth, with no nodules or goiter appreciated.  The trachea was mobile and midline.  Neurologic - CN II-XII are grossly intact, no focal neurologic deficits.         A/P -     ICD-10-CM    1. Episode of gagging  R19.8 esomeprazole (NEXIUM) 20 MG DR capsule      2. LPRD (laryngopharyngeal  reflux disease)  K21.9 esomeprazole (NEXIUM) 20 MG DR pugh          Marlin Cabral is a 39 year old female with intermittent gagging episodes. This is much better with nexium and stopping NSAIDS and so this is likely an issue with reflux and/or gastritis, maybe with inflammation of her vagus nerve. Continue nexium. Work on weight loss. Avoid nsaids.       Dragan Cameron MD  Otolaryngology  [facility]      Again, thank you for allowing me to participate in the care of your patient.        Sincerely,        Dragan Cameron MD

## 2024-11-14 NOTE — PROGRESS NOTES
Chief Complaint - episodes of gagging    History of Present Illness - Marlin Cabral is a 39 year old female who has episodes of gagging. It can happen 2 times a month on average. It can happen a few times in one day. In between episodes she feels fine. In the moment she feels she may throw up, but doesn't. It is random, and passes. She does get reflux. She tried nexium for 2 weeks. It was less frequent. Voicing has seemed normal. When it doesn't happen she angi dysphagia, odynaphagia. I personally reviewed Keshawn Azevedo clinical notes in [facility]. She saw GI, had EGD (2/2024) and that was normal. She clears throat more.     She gets sinus pressure every 3-4 months. She denies discolored drainage.     I had her try nexium. She returns and notes she is much better. Gagging went from daily to 1-2 times a week. She stopped ibuprofen and that helped a lot too. She eats better.     Tests personally reviewed today for this visit:   1.) CT head 2/10/23   -  No acute intracranial process.   -  Chronic right maxillary sinusitis.   2.) 6/20/24 hgb A1c was 5.7.    Past Medical History -   Patient Active Problem List   Diagnosis    CARDIOVASCULAR SCREENING; LDL GOAL LESS THAN 160    Morbid obesity (H)       Current Medications -   Current Outpatient Medications:     esomeprazole (NEXIUM) 20 MG DR capsule, Take 1 capsule (20 mg) by mouth every morning (before breakfast) Take 30-60 minutes before eating., Disp: 30 capsule, Rfl: 5    hydrOXYzine (ATARAX) 25 MG tablet, Take 1-2 tablets (25-50 mg) by mouth 3 times daily as needed for anxiety, Disp: 60 tablet, Rfl: 3    levonorgestrel-ethinyl estradiol (AVIANE) 0.1-20 MG-MCG tablet, Take 1 tablet by mouth daily, Disp: , Rfl:     sertraline (ZOLOFT) 100 MG tablet, Take 1 tablet (100 mg) by mouth daily, Disp: 90 tablet, Rfl: 3    Allergies - No Known Allergies    Social History -   Social History     Socioeconomic History    Marital status: Single   Tobacco Use    Smoking status: Former      Types: Cigarettes    Smokeless tobacco: Never   Vaping Use    Vaping status: Never Used   Substance and Sexual Activity    Alcohol use: Not Currently    Drug use: Never    Sexual activity: Not Currently     Partners: Male     Birth control/protection: Abstinence, Pill   Other Topics Concern    Parent/sibling w/ CABG, MI or angioplasty before 65F 55M? No     Social Determinants of Health     Financial Resource Strain: Low Risk  (1/17/2024)    Financial Resource Strain     Within the past 12 months, have you or your family members you live with been unable to get utilities (heat, electricity) when it was really needed?: No   Food Insecurity: Low Risk  (1/17/2024)    Food Insecurity     Within the past 12 months, did you worry that your food would run out before you got money to buy more?: No     Within the past 12 months, did the food you bought just not last and you didn t have money to get more?: No   Transportation Needs: Low Risk  (1/17/2024)    Transportation Needs     Within the past 12 months, has lack of transportation kept you from medical appointments, getting your medicines, non-medical meetings or appointments, work, or from getting things that you need?: No   Interpersonal Safety: High Risk (1/18/2024)    Interpersonal Safety     Do you feel physically and emotionally safe where you currently live?: Yes     Within the past 12 months, have you been hit, slapped, kicked or otherwise physically hurt by someone?: No     Within the past 12 months, have you been humiliated or emotionally abused in other ways by your partner or ex-partner?: Yes   Housing Stability: Low Risk  (1/17/2024)    Housing Stability     Do you have housing? : Yes     Are you worried about losing your housing?: No       Family History -   Family History   Problem Relation Age of Onset    Hypertension Father     Depression Father     Anxiety Disorder Father     Obesity Father     Colon Cancer Paternal Grandfather     Breast Cancer  Paternal Grandmother     Obesity Paternal Grandmother     Hypertension Brother     Obesity Brother     Obesity Sister      Physical Exam  General - The patient is in no distress. Alert, answers questions and cooperates with examination appropriately.   Voice and Breathing - The patient was breathing comfortably without the use of accessory muscles. There was no wheezing, stridor, or stertor.  The patients voice was clear and strong.  Eyes - Extraocular movements intact.  Sclera were not icteric or injected, conjunctiva were pink and moist.  Mouth - Examination of the oral cavity showed pink, healthy oral mucosa. No lesions or ulcerations noted.  The tongue was mobile and midline.  Throat - The walls of the oropharynx were smooth, symmetric, and had no lesions or ulcerations.  The tonsillar pillars and soft palate were symmetric.  The uvula was midline on elevation.   Nose - External contour is symmetric, no gross deflection or scars.  Nasal mucosa is pink and moist with no abnormal mucus.  The septum was midline and non-obstructive, turbinates of normal size and position.  No polyps, masses, or purulence noted on examination.  Neck -  Soft, non-tender. Palpation of the occipital, submental, submandibular, internal jugular chain, and supraclavicular nodes did not demonstrate any abnormal lymph nodes or masses. No parotid masses. Palpation of the thyroid was soft and smooth, with no nodules or goiter appreciated.  The trachea was mobile and midline.  Neurologic - CN II-XII are grossly intact, no focal neurologic deficits.         A/P -     ICD-10-CM    1. Episode of gagging  R19.8 esomeprazole (NEXIUM) 20 MG DR capsule      2. LPRD (laryngopharyngeal reflux disease)  K21.9 esomeprazole (NEXIUM) 20 MG DR capsule          Marlin Cabral is a 39 year old female with intermittent gagging episodes. This is much better with nexium and stopping NSAIDS and so this is likely an issue with reflux and/or gastritis, maybe with  inflammation of her vagus nerve. Continue nexium. Work on weight loss. Avoid nsaids.       Dragan Cameron MD  Otolaryngology  [facility]

## 2024-12-27 ENCOUNTER — MYC REFILL (OUTPATIENT)
Dept: FAMILY MEDICINE | Facility: CLINIC | Age: 39
End: 2024-12-27
Payer: COMMERCIAL

## 2024-12-27 DIAGNOSIS — F41.9 ANXIETY: ICD-10-CM

## 2024-12-30 RX ORDER — SERTRALINE HYDROCHLORIDE 100 MG/1
100 TABLET, FILM COATED ORAL DAILY
Qty: 30 TABLET | Refills: 0 | Status: SHIPPED | OUTPATIENT
Start: 2024-12-30

## 2025-01-29 ENCOUNTER — TELEPHONE (OUTPATIENT)
Dept: OTOLARYNGOLOGY | Facility: CLINIC | Age: 40
End: 2025-01-29
Payer: COMMERCIAL

## 2025-01-29 NOTE — TELEPHONE ENCOUNTER
Left message with callback number to find out when MRI was done at [facility] so we can get the scan.    Beatrice White RN Care Coordinator, [facility] 01/29/25 2:44 PM

## 2025-01-30 NOTE — TELEPHONE ENCOUNTER
Patient left message, MRI was done in December.    Beatrice White RN Care Coordinator, [facility] 01/30/25 9:11 AM

## 2025-02-09 ASSESSMENT — ANXIETY QUESTIONNAIRES
GAD7 TOTAL SCORE: 0
IF YOU CHECKED OFF ANY PROBLEMS ON THIS QUESTIONNAIRE, HOW DIFFICULT HAVE THESE PROBLEMS MADE IT FOR YOU TO DO YOUR WORK, TAKE CARE OF THINGS AT HOME, OR GET ALONG WITH OTHER PEOPLE: NOT DIFFICULT AT ALL
5. BEING SO RESTLESS THAT IT IS HARD TO SIT STILL: NOT AT ALL
6. BECOMING EASILY ANNOYED OR IRRITABLE: NOT AT ALL
3. WORRYING TOO MUCH ABOUT DIFFERENT THINGS: NOT AT ALL
7. FEELING AFRAID AS IF SOMETHING AWFUL MIGHT HAPPEN: NOT AT ALL
2. NOT BEING ABLE TO STOP OR CONTROL WORRYING: NOT AT ALL
1. FEELING NERVOUS, ANXIOUS, OR ON EDGE: NOT AT ALL
4. TROUBLE RELAXING: NOT AT ALL

## 2025-02-10 NOTE — PROGRESS NOTES
Chief Complaint - episodes of gagging; sinusitis    History of Present Illness - Marlin Cabral is a 39 year old female who I saw 11/2024 when she was having episodes of gagging. It can happen 2 times a month on average. It can happen a few times in one day. In between episodes she feels fine. In the moment she feels she may throw up, but doesn't. It is random, and passes. She does get reflux. She tried nexium for 2 weeks. It was less frequent. Voicing has seemed normal. When it doesn't happen she angi dysphagia, odynaphagia. I personally reviewed Keshawn Azevedo clinical notes in [facility]. She saw GI, had EGD (2/2024) and that was normal. She clears throat more. I had her try nexium. She returns and notes she is much better. Gagging went from daily to 1-2 times a week. She stopped ibuprofen and that helped a lot too. She eats better. Overall throat is better, but still gets some postnasal drainage, and gagging. Comes and goes.     She gets sinus pressure every 3-4 months. She denies discolored drainage, but gets a lot of postnasal drainage. She has been on antibiotics a lot. Doesn't help. Has tried steroids too. She breathes okay. She clears throat more.     Tests personally reviewed today for this visit:   1.) CT head 2/10/23   -  No acute intracranial process.   -  Chronic right maxillary sinusitis.   2.) 6/20/24 hgb A1c was 5.7.  3.) MRI neck from 12/20/24 images personally reviewed showed right maxillary sinus opacification. Other sinuses were clear.     Past Medical History -   Patient Active Problem List   Diagnosis    CARDIOVASCULAR SCREENING; LDL GOAL LESS THAN 160    Morbid obesity (H)       Current Medications -   Current Outpatient Medications:     esomeprazole (NEXIUM) 20 MG DR capsule, Take 1 capsule (20 mg) by mouth every morning (before breakfast). Take 30-60 minutes before eating., Disp: 30 capsule, Rfl: 11    hydrOXYzine (ATARAX) 25 MG tablet, Take 1-2 tablets (25-50 mg) by mouth 3 times daily as  needed for anxiety, Disp: 60 tablet, Rfl: 3    levonorgestrel-ethinyl estradiol (AVIANE) 0.1-20 MG-MCG tablet, Take 1 tablet by mouth daily, Disp: , Rfl:     sertraline (ZOLOFT) 100 MG tablet, Take 1 tablet (100 mg) by mouth daily. Needs in person visit for further refills., Disp: 30 tablet, Rfl: 0    Allergies - No Known Allergies    Social History -   Social History     Socioeconomic History    Marital status: Single   Tobacco Use    Smoking status: Former     Types: Cigarettes    Smokeless tobacco: Never   Vaping Use    Vaping status: Never Used   Substance and Sexual Activity    Alcohol use: Not Currently    Drug use: Never    Sexual activity: Not Currently     Partners: Male     Birth control/protection: Abstinence, Pill   Other Topics Concern    Parent/sibling w/ CABG, MI or angioplasty before 65F 55M? No     Social Determinants of Health     Financial Resource Strain: Low Risk  (1/17/2024)    Financial Resource Strain     Within the past 12 months, have you or your family members you live with been unable to get utilities (heat, electricity) when it was really needed?: No   Food Insecurity: Low Risk  (1/17/2024)    Food Insecurity     Within the past 12 months, did you worry that your food would run out before you got money to buy more?: No     Within the past 12 months, did the food you bought just not last and you didn t have money to get more?: No   Transportation Needs: Low Risk  (1/17/2024)    Transportation Needs     Within the past 12 months, has lack of transportation kept you from medical appointments, getting your medicines, non-medical meetings or appointments, work, or from getting things that you need?: No   Interpersonal Safety: High Risk (1/18/2024)    Interpersonal Safety     Do you feel physically and emotionally safe where you currently live?: Yes     Within the past 12 months, have you been hit, slapped, kicked or otherwise physically hurt by someone?: No     Within the past 12 months, have  you been humiliated or emotionally abused in other ways by your partner or ex-partner?: Yes   Housing Stability: Low Risk  (1/17/2024)    Housing Stability     Do you have housing? : Yes     Are you worried about losing your housing?: No       Family History -   Family History   Problem Relation Age of Onset    Hypertension Father     Depression Father     Anxiety Disorder Father     Obesity Father     Colon Cancer Paternal Grandfather     Breast Cancer Paternal Grandmother     Obesity Paternal Grandmother     Hypertension Brother     Obesity Brother     Obesity Sister      Physical Exam  General - The patient is in no distress. Alert, answers questions and cooperates with examination appropriately.   Voice and Breathing - The patient was breathing comfortably without the use of accessory muscles. There was no wheezing, stridor, or stertor.  The patients voice was clear and strong.  Mouth - Examination of the oral cavity showed pink, healthy oral mucosa. No lesions or ulcerations noted.  The tongue was mobile and midline.  Throat - The walls of the oropharynx were smooth, symmetric, and had no lesions or ulcerations.  The tonsillar pillars and soft palate were symmetric.  The uvula was midline on elevation.   Nose - External contour is symmetric, no gross deflection or scars.  Nasal mucosa is pink and moist with no abnormal mucus.  The septum was midline and non-obstructive, turbinates of normal size and position.  No polyps, masses, or purulence noted on examination.  Neck -  Soft, non-tender. Palpation of the occipital, submental, submandibular, internal jugular chain, and supraclavicular nodes did not demonstrate any abnormal lymph nodes or masses. No parotid masses. Palpation of the thyroid was soft and smooth, with no nodules or goiter appreciated.  The trachea was mobile and midline.  Neurologic - CN II-XII are grossly intact, no focal neurologic deficits.         A/P -     ICD-10-CM    1. Chronic right  maxillary sinusitis  J32.0 CT Sinus w/o Contrast     doxycycline hyclate (VIBRAMYCIN) 100 MG capsule     predniSONE (DELTASONE) 20 MG tablet        Marlin Cabral is a 39 year old female with intermittent gagging episodes. This is much better with nexium and stopping NSAIDS and so this is likely an issue with reflux and/or gastritis, maybe with inflammation of her vagus nerve. Continue nexium. Work on weight loss. Avoid nsaids.     She had an MRI of her cervical spine in December that showed chronic right maxillary sinus opacification.  This was also seen on a CT head from 2023.  Therefore I do think some of this postnasal drainage is due to that.  I will try her on a course of doxycycline for 3 weeks with prednisone.  I want her to get a CT sinus in 4 to 6 weeks upon completion of therapy.  If this still shows chronic maxillary sinusitis I recommend endoscopic right maxillary antrostomy.  Given her BMI this would have to be done at [facility].      Dragan Cameron MD  Otolaryngology  [facility]

## 2025-02-12 ENCOUNTER — TELEPHONE (OUTPATIENT)
Dept: FAMILY MEDICINE | Facility: CLINIC | Age: 40
End: 2025-02-12
Payer: COMMERCIAL

## 2025-02-12 ASSESSMENT — ANXIETY QUESTIONNAIRES
IF YOU CHECKED OFF ANY PROBLEMS ON THIS QUESTIONNAIRE, HOW DIFFICULT HAVE THESE PROBLEMS MADE IT FOR YOU TO DO YOUR WORK, TAKE CARE OF THINGS AT HOME, OR GET ALONG WITH OTHER PEOPLE: NOT DIFFICULT AT ALL
4. TROUBLE RELAXING: NOT AT ALL
8. IF YOU CHECKED OFF ANY PROBLEMS, HOW DIFFICULT HAVE THESE MADE IT FOR YOU TO DO YOUR WORK, TAKE CARE OF THINGS AT HOME, OR GET ALONG WITH OTHER PEOPLE?: NOT DIFFICULT AT ALL
GAD7 TOTAL SCORE: 0
6. BECOMING EASILY ANNOYED OR IRRITABLE: NOT AT ALL
2. NOT BEING ABLE TO STOP OR CONTROL WORRYING: NOT AT ALL
GAD7 TOTAL SCORE: 0
7. FEELING AFRAID AS IF SOMETHING AWFUL MIGHT HAPPEN: NOT AT ALL
3. WORRYING TOO MUCH ABOUT DIFFERENT THINGS: NOT AT ALL
7. FEELING AFRAID AS IF SOMETHING AWFUL MIGHT HAPPEN: NOT AT ALL
GAD7 TOTAL SCORE: 0
5. BEING SO RESTLESS THAT IT IS HARD TO SIT STILL: NOT AT ALL
1. FEELING NERVOUS, ANXIOUS, OR ON EDGE: NOT AT ALL

## 2025-02-12 NOTE — TELEPHONE ENCOUNTER
Bashir Bermudez PA-C  [facility] [place]  Please contact patient.  Needs in person visit.  Has not been seen in person for over 1 year.    Bashir Bermudez PA-C    Called and spoke to patient, appointment changed to in-person.[facility]

## 2025-02-13 ENCOUNTER — OFFICE VISIT (OUTPATIENT)
Dept: OTOLARYNGOLOGY | Facility: CLINIC | Age: 40
End: 2025-02-13
Payer: COMMERCIAL

## 2025-02-13 ENCOUNTER — OFFICE VISIT (OUTPATIENT)
Dept: FAMILY MEDICINE | Facility: CLINIC | Age: 40
End: 2025-02-13
Payer: COMMERCIAL

## 2025-02-13 VITALS
SYSTOLIC BLOOD PRESSURE: 134 MMHG | HEIGHT: 63 IN | OXYGEN SATURATION: 97 % | TEMPERATURE: 98.6 F | HEART RATE: 66 BPM | RESPIRATION RATE: 16 BRPM | DIASTOLIC BLOOD PRESSURE: 86 MMHG | WEIGHT: 293 LBS | BODY MASS INDEX: 51.91 KG/M2

## 2025-02-13 VITALS
DIASTOLIC BLOOD PRESSURE: 78 MMHG | HEIGHT: 63 IN | BODY MASS INDEX: 51.91 KG/M2 | WEIGHT: 293 LBS | RESPIRATION RATE: 16 BRPM | OXYGEN SATURATION: 100 % | SYSTOLIC BLOOD PRESSURE: 138 MMHG | HEART RATE: 67 BPM

## 2025-02-13 DIAGNOSIS — F41.9 ANXIETY: Primary | ICD-10-CM

## 2025-02-13 DIAGNOSIS — J32.0 CHRONIC RIGHT MAXILLARY SINUSITIS: Primary | ICD-10-CM

## 2025-02-13 DIAGNOSIS — E66.01 MORBID OBESITY (H): ICD-10-CM

## 2025-02-13 RX ORDER — PREDNISONE 20 MG/1
TABLET ORAL
Qty: 20 TABLET | Refills: 0 | Status: SHIPPED | OUTPATIENT
Start: 2025-02-13

## 2025-02-13 RX ORDER — DOXYCYCLINE 100 MG/1
100 CAPSULE ORAL 2 TIMES DAILY
Qty: 42 CAPSULE | Refills: 0 | Status: SHIPPED | OUTPATIENT
Start: 2025-02-13 | End: 2025-03-06

## 2025-02-13 RX ORDER — HYDROXYZINE HYDROCHLORIDE 25 MG/1
25-50 TABLET, FILM COATED ORAL 3 TIMES DAILY PRN
Qty: 60 TABLET | Refills: 3 | Status: SHIPPED | OUTPATIENT
Start: 2025-02-13

## 2025-02-13 ASSESSMENT — PAIN SCALES - GENERAL
PAINLEVEL_OUTOF10: NO PAIN (0)
PAINLEVEL_OUTOF10: NO PAIN (0)

## 2025-02-13 NOTE — PROGRESS NOTES
"  Assessment & Plan     (F41.9) Anxiety  (primary encounter diagnosis)  Comment: History of anxiety.  On sertraline self titrated down from 100 mg to 50 mg.  Refills of 50 mg provided.  Patient feels this is holding well.  No thoughts of self-harm.  Refill of hydroxyzine also.  Here for recheck.  If any worsening or new thoughts of self-harm to seek emergent evaluation.  Plan: sertraline (ZOLOFT) 50 MG tablet, hydrOXYzine         HCl (ATARAX) 25 MG tablet                BMI  Estimated body mass index is 51.9 kg/m  as calculated from the following:    Height as of this encounter: 1.61 m (5' 3.39\").    Weight as of this encounter: 134.5 kg (296 lb 9.6 oz).   Weight management plan: Discussed with patient healthy lifestyle modifications.  Reviewed potential of GLP medications.  Patient would prefer not to start medications at this time.  Reviewed side effect profile.  No family history of medullary thyroid cancer.  No personal history of pancreatitis  Elicia Isaac is a 39 year old, presenting for the following health issues:  Follow Up, Refill Request, and Recheck Medication      2/13/2025     9:04 AM   Additional Questions   Roomed by PRINCESS HIDALGO   Accompanied by SELF     History of Present Illness       Mental Health Follow-up:  Patient presents to follow-up on Depression & Anxiety.Patient's depression since last visit has been:  Good  The patient is not having other symptoms associated with depression.  Patient's anxiety since last visit has been:  Good  The patient is not having other symptoms associated with anxiety.  Any significant life events: No  Patient is not feeling anxious or having panic attacks.  Patient has no concerns about alcohol or drug use.    She eats 0-1 servings of fruits and vegetables daily.She consumes 1 sweetened beverage(s) daily.She exercises with enough effort to increase her heart rate 9 or less minutes per day.  She exercises with enough effort to increase her heart rate 3 or " "less days per week.   She is taking medications regularly.     Patient presents for recheck of anxiety.  Patient on sertraline 100 mg tablet.  Had started taking half tablet over the last few months.  This has been holding well.  No thoughts of self-harm.  Patient did have divorce in March of last year.  The patient believes marriage stress was contributing to her mental health issues and feels in a much better place at this time.  Not following with a therapist.  Has had some stressors with managing house independently.  Overall feels well with the 50 mg tablet.  Rarely uses hydroxyzine.    Patient has had weight gain over the last year.  Has had successful weight loss in the past on her own.  Would like to restart healthy lifestyle modifications independently      Review of Systems  Constitutional, HEENT, cardiovascular, pulmonary, gi and gu systems are negative, except as otherwise noted.      Objective    /86   Pulse 66   Temp 98.6  F (37  C) (Tympanic)   Resp 16   Ht 1.61 m (5' 3.39\")   Wt 134.5 kg (296 lb 9.6 oz)   SpO2 97%   BMI 51.90 kg/m    Body mass index is 51.9 kg/m .  Physical Exam   GENERAL: alert and no distress  RESP: lungs clear to auscultation - no rales, rhonchi or wheezes  CV: regular rates and rhythm, no murmur, click or rub, peripheral pulses strong, and no peripheral edema  MS: no gross musculoskeletal defects noted, no edema  PSYCH: mentation appears normal, affect normal/bright         Signed Electronically by: Bashir Bermudez PA-C    "

## 2025-02-13 NOTE — LETTER
2/13/2025      Mariln Cabral  [address] [place] [facility] [address]      Dear Colleague,    Thank you for referring your patient, Marlin Cabral, to the [facility]. Please see a copy of my visit note below.    Chief Complaint - episodes of gagging; sinusitis    History of Present Illness - Marlin Cabral is a 39 year old female who I saw 11/2024 when she was having episodes of gagging. It can happen 2 times a month on average. It can happen a few times in one day. In between episodes she feels fine. In the moment she feels she may throw up, but doesn't. It is random, and passes. She does get reflux. She tried nexium for 2 weeks. It was less frequent. Voicing has seemed normal. When it doesn't happen she angi dysphagia, odynaphagia. I personally reviewed Keshawn Azevedo clinical notes in [facility]. She saw GI, had EGD (2/2024) and that was normal. She clears throat more. I had her try nexium. She returns and notes she is much better. Gagging went from daily to 1-2 times a week. She stopped ibuprofen and that helped a lot too. She eats better. Overall throat is better, but still gets some postnasal drainage, and gagging. Comes and goes.     She gets sinus pressure every 3-4 months. She denies discolored drainage, but gets a lot of postnasal drainage. She has been on antibiotics a lot. Doesn't help. Has tried steroids too. She breathes okay. She clears throat more.     Tests personally reviewed today for this visit:   1.) CT head 2/10/23   -  No acute intracranial process.   -  Chronic right maxillary sinusitis.   2.) 6/20/24 hgb A1c was 5.7.  3.) MRI neck from 12/20/24 images personally reviewed showed right maxillary sinus opacification. Other sinuses were clear.     Past Medical History -   Patient Active Problem List   Diagnosis     CARDIOVASCULAR SCREENING; LDL GOAL LESS THAN 160     Morbid obesity (H)       Current Medications -   Current Outpatient Medications:      esomeprazole  (NEXIUM) 20 MG DR capsule, Take 1 capsule (20 mg) by mouth every morning (before breakfast). Take 30-60 minutes before eating., Disp: 30 capsule, Rfl: 11     hydrOXYzine (ATARAX) 25 MG tablet, Take 1-2 tablets (25-50 mg) by mouth 3 times daily as needed for anxiety, Disp: 60 tablet, Rfl: 3     levonorgestrel-ethinyl estradiol (AVIANE) 0.1-20 MG-MCG tablet, Take 1 tablet by mouth daily, Disp: , Rfl:      sertraline (ZOLOFT) 100 MG tablet, Take 1 tablet (100 mg) by mouth daily. Needs in person visit for further refills., Disp: 30 tablet, Rfl: 0    Allergies - No Known Allergies    Social History -   Social History     Socioeconomic History     Marital status: Single   Tobacco Use     Smoking status: Former     Types: Cigarettes     Smokeless tobacco: Never   Vaping Use     Vaping status: Never Used   Substance and Sexual Activity     Alcohol use: Not Currently     Drug use: Never     Sexual activity: Not Currently     Partners: Male     Birth control/protection: Abstinence, Pill   Other Topics Concern     Parent/sibling w/ CABG, MI or angioplasty before 65F 55M? No     Social Determinants of Health     Financial Resource Strain: Low Risk  (1/17/2024)    Financial Resource Strain      Within the past 12 months, have you or your family members you live with been unable to get utilities (heat, electricity) when it was really needed?: No   Food Insecurity: Low Risk  (1/17/2024)    Food Insecurity      Within the past 12 months, did you worry that your food would run out before you got money to buy more?: No      Within the past 12 months, did the food you bought just not last and you didn t have money to get more?: No   Transportation Needs: Low Risk  (1/17/2024)    Transportation Needs      Within the past 12 months, has lack of transportation kept you from medical appointments, getting your medicines, non-medical meetings or appointments, work, or from getting things that you need?: No   Interpersonal Safety: High Risk  (1/18/2024)    Interpersonal Safety      Do you feel physically and emotionally safe where you currently live?: Yes      Within the past 12 months, have you been hit, slapped, kicked or otherwise physically hurt by someone?: No      Within the past 12 months, have you been humiliated or emotionally abused in other ways by your partner or ex-partner?: Yes   Housing Stability: Low Risk  (1/17/2024)    Housing Stability      Do you have housing? : Yes      Are you worried about losing your housing?: No       Family History -   Family History   Problem Relation Age of Onset     Hypertension Father      Depression Father      Anxiety Disorder Father      Obesity Father      Colon Cancer Paternal Grandfather      Breast Cancer Paternal Grandmother      Obesity Paternal Grandmother      Hypertension Brother      Obesity Brother      Obesity Sister      Physical Exam  General - The patient is in no distress. Alert, answers questions and cooperates with examination appropriately.   Voice and Breathing - The patient was breathing comfortably without the use of accessory muscles. There was no wheezing, stridor, or stertor.  The patients voice was clear and strong.  Mouth - Examination of the oral cavity showed pink, healthy oral mucosa. No lesions or ulcerations noted.  The tongue was mobile and midline.  Throat - The walls of the oropharynx were smooth, symmetric, and had no lesions or ulcerations.  The tonsillar pillars and soft palate were symmetric.  The uvula was midline on elevation.   Nose - External contour is symmetric, no gross deflection or scars.  Nasal mucosa is pink and moist with no abnormal mucus.  The septum was midline and non-obstructive, turbinates of normal size and position.  No polyps, masses, or purulence noted on examination.  Neck -  Soft, non-tender. Palpation of the occipital, submental, submandibular, internal jugular chain, and supraclavicular nodes did not demonstrate any abnormal lymph nodes or  masses. No parotid masses. Palpation of the thyroid was soft and smooth, with no nodules or goiter appreciated.  The trachea was mobile and midline.  Neurologic - CN II-XII are grossly intact, no focal neurologic deficits.         A/P -     ICD-10-CM    1. Chronic right maxillary sinusitis  J32.0 CT Sinus w/o Contrast     doxycycline hyclate (VIBRAMYCIN) 100 MG capsule     predniSONE (DELTASONE) 20 MG tablet        Marlin Cabral is a 39 year old female with intermittent gagging episodes. This is much better with nexium and stopping NSAIDS and so this is likely an issue with reflux and/or gastritis, maybe with inflammation of her vagus nerve. Continue nexium. Work on weight loss. Avoid nsaids.     She had an MRI of her cervical spine in December that showed chronic right maxillary sinus opacification.  This was also seen on a CT head from 2023.  Therefore I do think some of this postnasal drainage is due to that.  I will try her on a course of doxycycline for 3 weeks with prednisone.  I want her to get a CT sinus in 4 to 6 weeks upon completion of therapy.  If this still shows chronic maxillary sinusitis I recommend endoscopic right maxillary antrostomy.  Given her BMI this would have to be done at [facility].      Dragan Cameron MD  Otolaryngology  [facility]      Again, thank you for allowing me to participate in the care of your patient.        Sincerely,        Dragan Cameron MD    Electronically signed

## 2025-02-14 ENCOUNTER — TELEPHONE (OUTPATIENT)
Dept: OTOLARYNGOLOGY | Facility: CLINIC | Age: 40
End: 2025-02-14
Payer: COMMERCIAL

## 2025-02-14 NOTE — TELEPHONE ENCOUNTER
Type of surgery: MAXILLARY ANTROSTOMY, ENDOSCOPIC (Right)   Location of surgery: Other: [facility]  Date and time of surgery: 3/21  Surgeon: blu   Pre-Op Appt Date: 3/13  Post-Op Appt Date: 3/28 4/10  Packet sent out: Yes  Pre-cert/Authorization completed:  Not Applicable  Date:

## 2025-02-24 ENCOUNTER — TELEPHONE (OUTPATIENT)
Dept: INTERNAL MEDICINE | Facility: CLINIC | Age: 40
End: 2025-02-24
Payer: COMMERCIAL

## 2025-02-24 NOTE — TELEPHONE ENCOUNTER
Patient Quality Outreach    Patient is due for the following:   Cervical Cancer Screening - PAP Needed  Physical Preventive Adult Physical      Topic Date Due    Hepatitis B Vaccine (1 of 3 - 19+ 3-dose series) Never done    Flu Vaccine (1) Never done    COVID-19 Vaccine (1 - 2024-25 season) Never done       Action(s) Taken:   Schedule a Adult Preventative    Type of outreach:    Sent [employer] message.    Questions for provider review:               Jaime Desir MA

## 2025-03-13 ENCOUNTER — OFFICE VISIT (OUTPATIENT)
Dept: FAMILY MEDICINE | Facility: CLINIC | Age: 40
End: 2025-03-13
Payer: COMMERCIAL

## 2025-03-13 VITALS
HEART RATE: 75 BPM | SYSTOLIC BLOOD PRESSURE: 133 MMHG | OXYGEN SATURATION: 98 % | TEMPERATURE: 98.4 F | DIASTOLIC BLOOD PRESSURE: 89 MMHG | WEIGHT: 285 LBS | BODY MASS INDEX: 50.5 KG/M2 | HEIGHT: 63 IN

## 2025-03-13 DIAGNOSIS — Z01.818 PREOP GENERAL PHYSICAL EXAM: Primary | ICD-10-CM

## 2025-03-13 DIAGNOSIS — J32.0 CHRONIC MAXILLARY SINUSITIS: ICD-10-CM

## 2025-03-13 LAB
ANION GAP SERPL CALCULATED.3IONS-SCNC: 6 MMOL/L (ref 7–15)
BUN SERPL-MCNC: 11.3 MG/DL (ref 6–20)
CALCIUM SERPL-MCNC: 9.6 MG/DL (ref 8.8–10.4)
CHLORIDE SERPL-SCNC: 103 MMOL/L (ref 98–107)
CREAT SERPL-MCNC: 0.81 MG/DL (ref 0.51–0.95)
EGFRCR SERPLBLD CKD-EPI 2021: >90 ML/MIN/1.73M2
GLUCOSE SERPL-MCNC: 103 MG/DL (ref 70–99)
HCG SERPL QL: NEGATIVE
HCO3 SERPL-SCNC: 28 MMOL/L (ref 22–29)
HGB BLD-MCNC: 13 G/DL (ref 11.7–15.7)
POTASSIUM SERPL-SCNC: 4.2 MMOL/L (ref 3.4–5.3)
SODIUM SERPL-SCNC: 137 MMOL/L (ref 135–145)

## 2025-03-13 ASSESSMENT — PAIN SCALES - GENERAL: PAINLEVEL_OUTOF10: NO PAIN (0)

## 2025-03-13 NOTE — PATIENT INSTRUCTIONS
How to Take Your Medication Before Surgery  Preoperative Medication Instructions   Take prescription medications as prescribed       Patient Education   Preparing for Your Surgery  For Adults  Getting started  In most cases, a nurse will call to review your health history and instructions. They will give you an arrival time based on your scheduled surgery time. Please be ready to share:  Your doctor's clinic name and phone number  Your medical, surgical, and anesthesia history  A list of allergies and sensitivities  A list of medicines, including herbal treatments and over-the-counter drugs  Whether the patient has a legal guardian (ask how to send us the papers in advance)  Note: You may not receive a call if you were seen at our PAC (Preoperative Assessment Center).  Please tell us if you're pregnant--or if there's any chance you might be pregnant. Some surgeries may injure a fetus (unborn baby), so they require a pregnancy test. Surgeries that are safe for a fetus don't always need a test, and you can choose whether to have one.   Preparing for surgery  Within 10 to 30 days of surgery: Have a pre-op exam (sometimes called an H&P, or History and Physical). This can be done at a clinic or pre-operative center.  If you're having a , you may not need this exam. Talk to your care team.  At your pre-op exam, talk to your care team about all medicines you take. (This includes CBD oil and any drugs, such as THC, marijuana, and other forms of cannabis.) If you need to stop any medicine before surgery, ask when to start taking it again.  This is for your safety. Many medicines and drugs can make you bleed too much during surgery. Some change how well surgery (anesthesia) drugs work.  Call your insurance company to let them know you're having surgery. (If you don't have insurance, call [phone].)  Call your clinic if there's any change in your health. This includes a scrape or scratch near the surgery site, or  any signs of a cold (sore throat, runny nose, cough, rash, fever).  Eating and drinking guidelines  For your safety: Unless your surgeon tells you otherwise, follow the guidelines below.  Eat and drink as normal until 8 hours before you arrive for surgery. After that, no food or milk. You can spit out gum when you arrive.  Drink clear liquids until 2 hours before you arrive. These are liquids you can see through, like water, Gatorade, and Propel Water. They also include plain black coffee and tea (no cream or milk).  No alcohol for 24 hours before you arrive. The night before surgery, stop any drinks that contain THC.  If your care team tells you to take medicine on the morning of surgery, it's okay to take it with a sip of water. No other medicines or drugs are allowed (including CBD oil)--follow your care team's instructions.  If you have questions the day of surgery, call your hospital or surgery center.   Preventing infection  Shower or bathe the night before and the morning of surgery. Follow the instructions your clinic gave you. (If no instructions, use regular soap.)  Don't shave or clip hair near your surgery site. We'll remove the hair if needed.  Don't smoke or vape the morning of surgery. No chewing tobacco for 6 hours before you arrive. A nicotine patch is okay. You may spit out nicotine gum when you arrive.  For some surgeries, the surgeon will tell you to fully quit smoking and nicotine.  We will make every effort to keep you safe from infection. We will:  Clean our hands often with soap and water (or an alcohol-based hand rub).  Clean the skin at your surgery site with a special soap that kills germs.  Give you a special gown to keep you warm. (Cold raises the risk of infection.)  Wear hair covers, masks, gowns, and gloves during surgery.  Give antibiotic medicine, if prescribed. Not all surgeries need this medicine.  What to bring on the day of surgery  Photo ID and insurance card  Copy of your  health care directive, if you have one  Glasses and hearing aids (bring cases)  You can't wear contacts during surgery  Inhaler and eye drops, if you use them (tell us about these when you arrive)  CPAP machine or breathing device, if you use them  A few personal items, if spending the night  If you have . . .  A pacemaker, ICD (cardiac defibrillator), or other implant: Bring the ID card.  An implanted stimulator: Bring the remote control.  A legal guardian: Bring a copy of the certified (court-stamped) guardianship papers.  Please remove any jewelry, including body piercings. Leave jewelry and other valuables at home.  If you're going home the day of surgery  You must have a responsible adult drive you home. They should stay with you overnight as well.  If you don't have someone to stay with you, and you aren't safe to go home alone, we may keep you overnight. Insurance often won't pay for this.  After surgery  If it's hard to control your pain or you need more pain medicine, please call your surgeon's office.  Questions?   If you have any questions for your care team, list them here:   ____________________________________________________________________________________________________________________________________________________________________________________________________________________________________________________________  For informational purposes only. Not to replace the advice of your health care provider. Copyright   2003, 2019 [facility]. All rights reserved. Clinically reviewed by Tony Man MD. [email or web address] [id number] - REV 08/24.

## 2025-03-13 NOTE — PROGRESS NOTES
Preoperative Evaluation  [facility]  [address] [facility] [address]  Phone: [phone]  Primary Provider: [facility] - Lexii Lorenzo  Pre-op Performing Provider: Chrystal Pickett CNP  Mar 13, 2025           3/12/2025   Surgical Information   What procedure is being done? Sinus   Facility or Hospital where procedure/surgery will be performed: [facility]   Who is doing the procedure / surgery? Ent   Date of surgery / procedure: 3/21/25   Time of surgery / procedure: Unknown   Where do you plan to recover after surgery? at home with family     Fax number for surgical facility: Note does not need to be faxed, will be available electronically in Epic.    Assessment & Plan     The proposed surgical procedure is considered LOW risk.    Preop general physical exam  - Basic metabolic panel  (Ca, Cl, CO2, Creat, Gluc, K, Na, BUN)  - Hemoglobin  - HCG qualitative    Chronic maxillary sinusitis       Risks and Recommendations  The patient has the following additional risks and recommendations for perioperative complications:   - Morbid obesity (BMI >40)    Preoperative Medication Instructions  Take prescription medications as prescribed    Recommendation  Approval given to proceed with proposed procedure, without further diagnostic evaluation.    Elicia Isaac is a 39 year old, presenting for the following:  Pre-Op Exam      HPI: Marlin Cabral is having sinus surgery for a history of chronic sinusitis.             3/12/2025   Pre-Op Questionnaire   Have you ever had a heart attack or stroke? No   Have you ever had surgery on your heart or blood vessels, such as a stent placement, a coronary artery bypass, or surgery on an artery in your head, neck, heart, or legs? No   Do you have chest pain with activity? No   Do you have a history of heart failure? No   Do you currently have a cold, bronchitis or symptoms of other infection? No   Do you have a cough, shortness  of breath, or wheezing? No   Do you or anyone in your family have previous history of blood clots? No   Do you or does anyone in your family have a serious bleeding problem such as prolonged bleeding following surgeries or cuts? No   Have you ever had problems with anemia or been told to take iron pills? No   Have you had any abnormal blood loss such as black, tarry or bloody stools, or abnormal vaginal bleeding? No   Have you ever had a blood transfusion? No   Are you willing to have a blood transfusion if it is medically needed before, during, or after your surgery? Yes   Have you or any of your relatives ever had problems with anesthesia? No   Do you have sleep apnea, excessive snoring or daytime drowsiness? No   Do you have any artifical heart valves or other implanted medical devices like a pacemaker, defibrillator, or continuous glucose monitor? No   Do you have artificial joints? No   Are you allergic to latex? No     Health Care Directive  Patient does not have a Health Care Directive    Preoperative Review of    reviewed - no record of controlled substances prescribed.      Status of Chronic Conditions:  See problem list for active medical problems.  Problems all longstanding and stable, except as noted/documented.  See ROS for pertinent symptoms related to these conditions.    Patient Active Problem List    Diagnosis Date Noted    Chronic maxillary sinusitis 03/13/2025     Priority: Medium    Morbid obesity (H) 04/07/2023     Priority: Medium    CARDIOVASCULAR SCREENING; LDL GOAL LESS THAN 160 10/31/2010     Priority: Medium      Past Medical History:   Diagnosis Date    Depressive disorder 2023     Past Surgical History:   Procedure Laterality Date    BIOPSY  2023    Endoscopy     Current Outpatient Medications   Medication Sig Dispense Refill    esomeprazole (NEXIUM) 20 MG DR capsule Take 1 capsule (20 mg) by mouth every morning (before breakfast). Take 30-60 minutes before eating. 30 capsule 11     "hydrOXYzine HCl (ATARAX) 25 MG tablet Take 1-2 tablets (25-50 mg) by mouth 3 times daily as needed for anxiety. 60 tablet 3    levonorgestrel-ethinyl estradiol (AVIANE) 0.1-20 MG-MCG tablet Take 1 tablet by mouth daily      sertraline (ZOLOFT) 50 MG tablet Take 1 tablet (50 mg) by mouth daily. 90 tablet 3       No Known Allergies     Social History     Tobacco Use    Smoking status: Former     Types: Cigarettes    Smokeless tobacco: Never   Substance Use Topics    Alcohol use: Not Currently       History   Drug Use Unknown             Review of Systems  Constitutional, neuro, ENT, endocrine, pulmonary, cardiac, gastrointestinal, genitourinary, musculoskeletal, integument and psychiatric systems are negative, except as otherwise noted.    Objective    /89   Pulse 75   Temp 98.4  F (36.9  C) (Tympanic)   Ht 1.6 m (5' 3\")   Wt 129.3 kg (285 lb)   SpO2 98%   BMI 50.49 kg/m     Estimated body mass index is 50.49 kg/m  as calculated from the following:    Height as of this encounter: 1.6 m (5' 3\").    Weight as of this encounter: 129.3 kg (285 lb).  Physical Exam  GENERAL: alert and no distress  EYES: Eyes grossly normal to inspection, PERRL and conjunctivae and sclerae normal  HENT: ear canals and TM's normal, nose and mouth without ulcers or lesions  NECK: no adenopathy, no asymmetry, masses, or scars  RESP: lungs clear to auscultation - no rales, rhonchi or wheezes  CV: regular rate and rhythm, normal S1 S2, no S3 or S4, no murmur, click or rub, no peripheral edema  ABDOMEN: soft, nontender, no hepatosplenomegaly, no masses and bowel sounds normal  MS: no gross musculoskeletal defects noted, no edema  SKIN: no suspicious lesions or rashes  NEURO: Normal strength and tone, mentation intact and speech normal  PSYCH: mentation appears normal, affect normal/bright      Diagnostics  Labs pending at this time.  Results will be reviewed when available.   No EKG required, no history of coronary heart disease, " significant arrhythmia, peripheral arterial disease or other structural heart disease.    Revised Cardiac Risk Index (RCRI)  The patient has the following serious cardiovascular risks for perioperative complications:   - No serious cardiac risks = 0 points     RCRI Interpretation: 0 points: Class I (very low risk - 0.4% complication rate)         Signed Electronically by: Chrystal Pickett CNP  A copy of this evaluation report is provided to the requesting physician.

## 2025-03-13 NOTE — H&P (VIEW-ONLY)
Preoperative Evaluation  [facility]  [address] [facility] [address]  Phone: [phone]  Primary Provider: [facility] - Lexii Lorenzo  Pre-op Performing Provider: Chrystal Pickett CNP  Mar 13, 2025           3/12/2025   Surgical Information   What procedure is being done? Sinus   Facility or Hospital where procedure/surgery will be performed: [facility]   Who is doing the procedure / surgery? Ent   Date of surgery / procedure: 3/21/25   Time of surgery / procedure: Unknown   Where do you plan to recover after surgery? at home with family     Fax number for surgical facility: Note does not need to be faxed, will be available electronically in Epic.    Assessment & Plan     The proposed surgical procedure is considered LOW risk.    Preop general physical exam  - Basic metabolic panel  (Ca, Cl, CO2, Creat, Gluc, K, Na, BUN)  - Hemoglobin  - HCG qualitative    Chronic maxillary sinusitis       Risks and Recommendations  The patient has the following additional risks and recommendations for perioperative complications:   - Morbid obesity (BMI >40)    Preoperative Medication Instructions  Take prescription medications as prescribed    Recommendation  Approval given to proceed with proposed procedure, without further diagnostic evaluation.    Elicia Isaac is a 39 year old, presenting for the following:  Pre-Op Exam      HPI: Marlin Cabral is having sinus surgery for a history of chronic sinusitis.             3/12/2025   Pre-Op Questionnaire   Have you ever had a heart attack or stroke? No   Have you ever had surgery on your heart or blood vessels, such as a stent placement, a coronary artery bypass, or surgery on an artery in your head, neck, heart, or legs? No   Do you have chest pain with activity? No   Do you have a history of heart failure? No   Do you currently have a cold, bronchitis or symptoms of other infection? No   Do you have a cough, shortness  of breath, or wheezing? No   Do you or anyone in your family have previous history of blood clots? No   Do you or does anyone in your family have a serious bleeding problem such as prolonged bleeding following surgeries or cuts? No   Have you ever had problems with anemia or been told to take iron pills? No   Have you had any abnormal blood loss such as black, tarry or bloody stools, or abnormal vaginal bleeding? No   Have you ever had a blood transfusion? No   Are you willing to have a blood transfusion if it is medically needed before, during, or after your surgery? Yes   Have you or any of your relatives ever had problems with anesthesia? No   Do you have sleep apnea, excessive snoring or daytime drowsiness? No   Do you have any artifical heart valves or other implanted medical devices like a pacemaker, defibrillator, or continuous glucose monitor? No   Do you have artificial joints? No   Are you allergic to latex? No     Health Care Directive  Patient does not have a Health Care Directive    Preoperative Review of    reviewed - no record of controlled substances prescribed.      Status of Chronic Conditions:  See problem list for active medical problems.  Problems all longstanding and stable, except as noted/documented.  See ROS for pertinent symptoms related to these conditions.    Patient Active Problem List    Diagnosis Date Noted    Chronic maxillary sinusitis 03/13/2025     Priority: Medium    Morbid obesity (H) 04/07/2023     Priority: Medium    CARDIOVASCULAR SCREENING; LDL GOAL LESS THAN 160 10/31/2010     Priority: Medium      Past Medical History:   Diagnosis Date    Depressive disorder 2023     Past Surgical History:   Procedure Laterality Date    BIOPSY  2023    Endoscopy     Current Outpatient Medications   Medication Sig Dispense Refill    esomeprazole (NEXIUM) 20 MG DR capsule Take 1 capsule (20 mg) by mouth every morning (before breakfast). Take 30-60 minutes before eating. 30 capsule 11     "hydrOXYzine HCl (ATARAX) 25 MG tablet Take 1-2 tablets (25-50 mg) by mouth 3 times daily as needed for anxiety. 60 tablet 3    levonorgestrel-ethinyl estradiol (AVIANE) 0.1-20 MG-MCG tablet Take 1 tablet by mouth daily      sertraline (ZOLOFT) 50 MG tablet Take 1 tablet (50 mg) by mouth daily. 90 tablet 3       No Known Allergies     Social History     Tobacco Use    Smoking status: Former     Types: Cigarettes    Smokeless tobacco: Never   Substance Use Topics    Alcohol use: Not Currently       History   Drug Use Unknown             Review of Systems  Constitutional, neuro, ENT, endocrine, pulmonary, cardiac, gastrointestinal, genitourinary, musculoskeletal, integument and psychiatric systems are negative, except as otherwise noted.    Objective    /89   Pulse 75   Temp 98.4  F (36.9  C) (Tympanic)   Ht 1.6 m (5' 3\")   Wt 129.3 kg (285 lb)   SpO2 98%   BMI 50.49 kg/m     Estimated body mass index is 50.49 kg/m  as calculated from the following:    Height as of this encounter: 1.6 m (5' 3\").    Weight as of this encounter: 129.3 kg (285 lb).  Physical Exam  GENERAL: alert and no distress  EYES: Eyes grossly normal to inspection, PERRL and conjunctivae and sclerae normal  HENT: ear canals and TM's normal, nose and mouth without ulcers or lesions  NECK: no adenopathy, no asymmetry, masses, or scars  RESP: lungs clear to auscultation - no rales, rhonchi or wheezes  CV: regular rate and rhythm, normal S1 S2, no S3 or S4, no murmur, click or rub, no peripheral edema  ABDOMEN: soft, nontender, no hepatosplenomegaly, no masses and bowel sounds normal  MS: no gross musculoskeletal defects noted, no edema  SKIN: no suspicious lesions or rashes  NEURO: Normal strength and tone, mentation intact and speech normal  PSYCH: mentation appears normal, affect normal/bright      Diagnostics  Labs pending at this time.  Results will be reviewed when available.   No EKG required, no history of coronary heart disease, " significant arrhythmia, peripheral arterial disease or other structural heart disease.    Revised Cardiac Risk Index (RCRI)  The patient has the following serious cardiovascular risks for perioperative complications:   - No serious cardiac risks = 0 points     RCRI Interpretation: 0 points: Class I (very low risk - 0.4% complication rate)         Signed Electronically by: Chrystal Pickett CNP  A copy of this evaluation report is provided to the requesting physician.

## 2025-03-21 ENCOUNTER — HOSPITAL ENCOUNTER (OUTPATIENT)
Facility: HOSPITAL | Age: 40
Discharge: HOME | End: 2025-03-21
Attending: OTOLARYNGOLOGY | Admitting: OTOLARYNGOLOGY
Payer: COMMERCIAL

## 2025-03-21 VITALS
BODY MASS INDEX: 51.21 KG/M2 | TEMPERATURE: 98.2 F | WEIGHT: 289.1 LBS | RESPIRATION RATE: 20 BRPM | SYSTOLIC BLOOD PRESSURE: 141 MMHG | OXYGEN SATURATION: 98 % | HEART RATE: 62 BPM | DIASTOLIC BLOOD PRESSURE: 82 MMHG

## 2025-03-21 DIAGNOSIS — J32.0 CHRONIC MAXILLARY SINUSITIS: Primary | ICD-10-CM

## 2025-03-21 LAB
BACTERIA SPEC CULT: NORMAL
GRAM STAIN RESULT: NORMAL
GRAM STAIN RESULT: NORMAL

## 2025-03-21 PROCEDURE — 710N000012 HC RECOVERY PHASE 2, PER MINUTE: Performed by: OTOLARYNGOLOGY

## 2025-03-21 PROCEDURE — 272N000001 HC OR GENERAL SUPPLY STERILE: Performed by: OTOLARYNGOLOGY

## 2025-03-21 PROCEDURE — 370N000017 HC ANESTHESIA TECHNICAL FEE, PER MIN: Performed by: OTOLARYNGOLOGY

## 2025-03-21 PROCEDURE — 87070 CULTURE OTHR SPECIMN AEROBIC: CPT | Performed by: OTOLARYNGOLOGY

## 2025-03-21 PROCEDURE — 250N000025 HC SEVOFLURANE, PER MIN: Performed by: OTOLARYNGOLOGY

## 2025-03-21 PROCEDURE — 250N000011 HC RX IP 250 OP 636: Performed by: OTOLARYNGOLOGY

## 2025-03-21 PROCEDURE — 258N000001 HC RX 258: Performed by: OTOLARYNGOLOGY

## 2025-03-21 PROCEDURE — 87205 SMEAR GRAM STAIN: CPT | Performed by: OTOLARYNGOLOGY

## 2025-03-21 PROCEDURE — 360N000077 HC SURGERY LEVEL 4, PER MIN: Performed by: OTOLARYNGOLOGY

## 2025-03-21 PROCEDURE — 999N000141 HC STATISTIC PRE-PROCEDURE NURSING ASSESSMENT: Performed by: OTOLARYNGOLOGY

## 2025-03-21 PROCEDURE — 250N000013 HC RX MED GY IP 250 OP 250 PS 637: Performed by: OTOLARYNGOLOGY

## 2025-03-21 PROCEDURE — 87075 CULTR BACTERIA EXCEPT BLOOD: CPT | Performed by: OTOLARYNGOLOGY

## 2025-03-21 PROCEDURE — 710N000009 HC RECOVERY PHASE 1, LEVEL 1, PER MIN: Performed by: OTOLARYNGOLOGY

## 2025-03-21 PROCEDURE — 87102 FUNGUS ISOLATION CULTURE: CPT | Performed by: OTOLARYNGOLOGY

## 2025-03-21 PROCEDURE — C9046 COCAINE HCL NASAL SOLUTION: HCPCS | Performed by: OTOLARYNGOLOGY

## 2025-03-21 PROCEDURE — 31256 EXPLORATION MAXILLARY SINUS: CPT | Mod: RT | Performed by: OTOLARYNGOLOGY

## 2025-03-21 PROCEDURE — 258N000003 HC RX IP 258 OP 636: Performed by: ANESTHESIOLOGY

## 2025-03-21 RX ORDER — ONDANSETRON 2 MG/ML
4 INJECTION INTRAMUSCULAR; INTRAVENOUS EVERY 30 MIN PRN
Status: DISCONTINUED | OUTPATIENT
Start: 2025-03-21 | End: 2025-03-21 | Stop reason: HOSPADM

## 2025-03-21 RX ORDER — OXYCODONE HYDROCHLORIDE 5 MG/1
5 TABLET ORAL
Status: DISCONTINUED | OUTPATIENT
Start: 2025-03-21 | End: 2025-03-21 | Stop reason: HOSPADM

## 2025-03-21 RX ORDER — CEFAZOLIN SODIUM/WATER 3 G/30 ML
3 SYRINGE (ML) INTRAVENOUS
Status: COMPLETED | OUTPATIENT
Start: 2025-03-21 | End: 2025-03-21

## 2025-03-21 RX ORDER — SODIUM CHLORIDE, SODIUM LACTATE, POTASSIUM CHLORIDE, CALCIUM CHLORIDE 600; 310; 30; 20 MG/100ML; MG/100ML; MG/100ML; MG/100ML
INJECTION, SOLUTION INTRAVENOUS CONTINUOUS
Status: DISCONTINUED | OUTPATIENT
Start: 2025-03-21 | End: 2025-03-21 | Stop reason: HOSPADM

## 2025-03-21 RX ORDER — DEXMEDETOMIDINE HYDROCHLORIDE 4 UG/ML
INJECTION, SOLUTION INTRAVENOUS
Status: DISCONTINUED
Start: 2025-03-21 | End: 2025-03-21 | Stop reason: HOSPADM

## 2025-03-21 RX ORDER — FENTANYL CITRATE 50 UG/ML
25 INJECTION, SOLUTION INTRAMUSCULAR; INTRAVENOUS EVERY 5 MIN PRN
Status: DISCONTINUED | OUTPATIENT
Start: 2025-03-21 | End: 2025-03-21 | Stop reason: HOSPADM

## 2025-03-21 RX ORDER — OXYCODONE HYDROCHLORIDE 5 MG/1
10 TABLET ORAL
Status: DISCONTINUED | OUTPATIENT
Start: 2025-03-21 | End: 2025-03-21 | Stop reason: HOSPADM

## 2025-03-21 RX ORDER — ONDANSETRON 4 MG/1
4 TABLET, ORALLY DISINTEGRATING ORAL EVERY 30 MIN PRN
Status: DISCONTINUED | OUTPATIENT
Start: 2025-03-21 | End: 2025-03-21 | Stop reason: HOSPADM

## 2025-03-21 RX ORDER — HYDROCODONE BITARTRATE AND ACETAMINOPHEN 5; 325 MG/1; MG/1
1-2 TABLET ORAL EVERY 6 HOURS PRN
Qty: 12 TABLET | Refills: 0 | Status: SHIPPED | OUTPATIENT
Start: 2025-03-21

## 2025-03-21 RX ORDER — CEFAZOLIN SODIUM/WATER 3 G/30 ML
3 SYRINGE (ML) INTRAVENOUS SEE ADMIN INSTRUCTIONS
Status: DISCONTINUED | OUTPATIENT
Start: 2025-03-21 | End: 2025-03-21 | Stop reason: HOSPADM

## 2025-03-21 RX ORDER — LIDOCAINE HYDROCHLORIDE AND EPINEPHRINE 10; 10 MG/ML; UG/ML
INJECTION, SOLUTION INFILTRATION; PERINEURAL PRN
Status: DISCONTINUED | OUTPATIENT
Start: 2025-03-21 | End: 2025-03-21 | Stop reason: HOSPADM

## 2025-03-21 RX ORDER — COCAINE HYDROCHLORIDE 40 MG/ML
SOLUTION NASAL ONCE
Status: COMPLETED | OUTPATIENT
Start: 2025-03-21 | End: 2025-03-21

## 2025-03-21 RX ORDER — LIDOCAINE 40 MG/G
CREAM TOPICAL
Status: DISCONTINUED | OUTPATIENT
Start: 2025-03-21 | End: 2025-03-21 | Stop reason: HOSPADM

## 2025-03-21 RX ORDER — HYDROMORPHONE HCL IN WATER/PF 6 MG/30 ML
0.2 PATIENT CONTROLLED ANALGESIA SYRINGE INTRAVENOUS EVERY 5 MIN PRN
Status: DISCONTINUED | OUTPATIENT
Start: 2025-03-21 | End: 2025-03-21 | Stop reason: HOSPADM

## 2025-03-21 RX ORDER — DEXAMETHASONE SODIUM PHOSPHATE 10 MG/ML
4 INJECTION, SOLUTION INTRAMUSCULAR; INTRAVENOUS
Status: DISCONTINUED | OUTPATIENT
Start: 2025-03-21 | End: 2025-03-21 | Stop reason: HOSPADM

## 2025-03-21 RX ORDER — ACETAMINOPHEN 325 MG/1
975 TABLET ORAL ONCE
Status: COMPLETED | OUTPATIENT
Start: 2025-03-21 | End: 2025-03-21

## 2025-03-21 RX ORDER — NALOXONE HYDROCHLORIDE 1 MG/ML
0.1 INJECTION INTRAMUSCULAR; INTRAVENOUS; SUBCUTANEOUS
Status: DISCONTINUED | OUTPATIENT
Start: 2025-03-21 | End: 2025-03-21 | Stop reason: HOSPADM

## 2025-03-21 RX ORDER — HYDROMORPHONE HCL IN WATER/PF 6 MG/30 ML
0.4 PATIENT CONTROLLED ANALGESIA SYRINGE INTRAVENOUS EVERY 5 MIN PRN
Status: DISCONTINUED | OUTPATIENT
Start: 2025-03-21 | End: 2025-03-21 | Stop reason: HOSPADM

## 2025-03-21 RX ORDER — SOD CHLOR,SOD BICARB/NETI POT
1 PACKET, WITH RINSE DEVICE NASAL 3 TIMES DAILY
Qty: 100 EACH | Refills: 1 | Status: SHIPPED | OUTPATIENT
Start: 2025-03-21

## 2025-03-21 RX ORDER — FENTANYL CITRATE 50 UG/ML
50 INJECTION, SOLUTION INTRAMUSCULAR; INTRAVENOUS EVERY 5 MIN PRN
Status: DISCONTINUED | OUTPATIENT
Start: 2025-03-21 | End: 2025-03-21 | Stop reason: HOSPADM

## 2025-03-21 RX ORDER — NALOXONE HYDROCHLORIDE 0.4 MG/ML
0.1 INJECTION, SOLUTION INTRAMUSCULAR; INTRAVENOUS; SUBCUTANEOUS
Status: DISCONTINUED | OUTPATIENT
Start: 2025-03-21 | End: 2025-03-21 | Stop reason: HOSPADM

## 2025-03-21 RX ORDER — DEXAMETHASONE SODIUM PHOSPHATE 4 MG/ML
4 INJECTION, SOLUTION INTRA-ARTICULAR; INTRALESIONAL; INTRAMUSCULAR; INTRAVENOUS; SOFT TISSUE
Status: DISCONTINUED | OUTPATIENT
Start: 2025-03-21 | End: 2025-03-21 | Stop reason: HOSPADM

## 2025-03-21 RX ORDER — DEXAMETHASONE SODIUM PHOSPHATE 10 MG/ML
10 INJECTION, SOLUTION INTRAMUSCULAR; INTRAVENOUS ONCE
Status: DISCONTINUED | OUTPATIENT
Start: 2025-03-21 | End: 2025-03-21 | Stop reason: HOSPADM

## 2025-03-21 RX ADMIN — ACETAMINOPHEN 975 MG: 325 TABLET ORAL at 06:23

## 2025-03-21 RX ADMIN — SODIUM CHLORIDE, SODIUM LACTATE, POTASSIUM CHLORIDE, AND CALCIUM CHLORIDE: .6; .31; .03; .02 INJECTION, SOLUTION INTRAVENOUS at 06:55

## 2025-03-21 ASSESSMENT — ACTIVITIES OF DAILY LIVING (ADL)
ADLS_ACUITY_SCORE: 18
ADLS_ACUITY_SCORE: 41
ADLS_ACUITY_SCORE: 18

## 2025-03-21 NOTE — OP NOTE
PREOPERATIVE DIAGNOSES:   1. Chronic RIGHT maxillary sinusitis.   POSTOPERATIVE DIAGNOSES:   1. Chronic RIGHT maxillary sinusitis.   PROCEDURES PERFORMED:   1. RIGHT endoscopic maxillary antrostomy   SURGEON: Dragan Cameron MD   BLOOD LOSS: 10 mL.   COMPLICATIONS: None.   SPECIMENS: right maxillary sinus culture for aerobic, anaerobic, fungal and gram stain  ANESTHESIA: GETA.   GRAFTS, DRAINS, IMPLANTS: right dissolvable nasopore  FINDINGS: thick mucous right maxillary sinus, but did not look infected. Culture taken  INDICATIONS: Marlin Cabral  presented to me with a long history of chronic nasal disease and chronic maxillary sinusitis as seen on CT sinus (2/27/25) and CT head (2/10/2023). She had failed medical treatment. Therefore, my recommendation was for the above-named procedures. Preoperatively, risks discussed included the risks of infection, bleeding, the risks of general anesthesia, possible recurrence of nasal disease, possible injury to the eyes, base of skull and tear duct system, CSF leak, meningitis, and possible alteration of sense of smell. The patient understood these risks and possible outcomes and wished to proceed.   The patient was brought to the operating room and placed on the operating room bed. General anesthesia was induced and the patient was endotracheally intubated. The bed was turned 90 degrees. A procedural pause was performed to identify the patient by name, birthday, and procedure. I began by applying topical anesthetic in the form of 2 cottonoids on each side of the nose which had been soaked with a total of 4 mL of 4% liquid cocaine.    I removed the cottonoids from the nose and entered the right nasal cavity with a spinal needle and 0 degree endoscope. I injected 1% lidocaine with 1:100,000 epinephrine into the posterior lateral wall of the right nasal cavity as well as the body of the right middle turbinate and the anterior insertion of the right middle turbinate to the  lateral nasal wall. After I did this field block, I continued back into the right middle meatus. I took photos. The medial maxillary wall was lateralized. The uncinate was tight against the lamina. I used a blunt ball probe to tease the uncinate off the lamina. Next, I used the rotating backbiter and trimmed away at the uncinate process inferiorly to expose the natural ostium of the right maxillary sinus. I used the shaver inferior to superior to take down the uncinate. Next I used a 45 degree endoscope to look laterally. I took photos. The right maxillary sinus was filled with white, thick mucous. I took a culture.  I opened the right maxillary sinus by placing a thru-cut into the natural maxillary sinus os and opening this posteriorly to the posterior maxillary wall. I then used the 12 degree shaver and widened the antrostomy inferiorly. I then suctioned out all of the mucous from the sinus and irrigated the sinus.  At this point, the entire procedure was now complete. I reinspected both sides of the nose and there was good hemostasis. I then placed small pieces of Nasopore dressing in between the right middle turbinate and the lateral wall on the right to prevent lateralization and scarring. All instruments were accounted for and all counts were correct. The patient's bed was rotated 90 degrees back to the care of anesthesia. The patient was awakened, extubated and sent to the recovery room in good condition.

## 2025-03-21 NOTE — INTERVAL H&P NOTE
"I have reviewed the surgical (or preoperative) H&P that is linked to this encounter, and examined the patient. There are no significant changes.    Clinical Conditions Present on Arrival:  Clinically Significant Risk Factors Present on Admission                       # Severe Obesity: Estimated body mass index is 51.21 kg/m  as calculated from the following:    Height as of 3/13/25: 1.6 m (5' 3\").    Weight as of this encounter: 131.1 kg (289 lb 1.6 oz).       "

## 2025-03-21 NOTE — DISCHARGE INSTRUCTIONS
Instructions Following Endoscopic Sinus Surgery    Recovery - Everyone recovers differently from a general anesthetic.  Symptoms such as fatigue, nausea, light-headedness, and sometimes a low grade fever (up to 101 degrees) are not unusual.  As your body removes the anesthetic drugs from circulation, these symptoms will resolve.  Your nose will be sore after surgery, and you may even have symptoms similar to a sinus infection with headache, congestion, and pressure.  These symptoms are normal and will resolve with healing.  For a few days you may experience bloody drainage from the nose, please use the drip pad as necessary for this.  If you are soaking a drip pad more frequently than once every 20 minutes, or have perfuse bleeding, please call the office during business hours or the on call ENT physician after hours.  There are no diet restrictions after sinus surgery, and you can resume your home medications.  Please do not blow your nose for two weeks after surgery. You may wipe your nose gently. Limit your activity to no strenuous activities or exercise until I see you for the first follow-up visit.      Medications - You were sent home with narcotic pain medication.  If you can tolerate the discomfort during your recovery by using just plain Tylenol, please do so.  However, do not hesitate to use the stronger pain medication if needed.  If you were sent home with an antibiotic, it is primarily used to help the healing process.  If it causes loose bowel movements or other signs of intolerance, it is appropriate to discontinue it.  By far the most important measure you can take to speed recovery, and maximize the chances of long term success of sinus surgery is using the sinus rinses at least 3 times per day for the first 2-4 weeks after surgery.   Please start these rinses tomorrow    Complications - Problems related to sinus surgery almost always are detected during the operation, and special instructions will  be given in that situation.  However, unexpected things can happen, and are all related to the structures around the sinus cavities.  Symptoms that should alert you to a possible problem include: severe eye pain or eye swelling, significant vision changes or double vision, persistent heavy bleeding from the nose, and high fevers with headache and/or neck pain.  Any of these symptoms should be called into my office or to the on call ENT if after hours. To help with significant bleeding you can spray over-the-counter afrin (oxymetazoline) into the nose and pinch the nasal tip shut while leaning forward. This will help clotting and hopefully stop or greatly slow down the bleeding. If after 15-20 minutes the bleeding is still profuse, or is going down the back of the throat significantly, you should proceed to the emergency department.     Follow-up -  As you have noted, there are a few follow-up visits after sinus surgery.  This is done to aggressively manage the most common complication of this procedure, which is scar tissue blocking the sinuses.  These visits will require the examination of your nose and removal of old blood, mucous, and crusts of dry mucous and blood.  Please prepare for these visits by using your sinus rinses.    If there are any questions or issues with the above, or if there are other issues that concern you, always feel free to call [phone].    Dragan Cameron MD  Otolaryngology

## 2025-03-23 LAB — BACTERIA SPEC CULT: NORMAL

## 2025-03-24 LAB — BACTERIA SPEC CULT: NORMAL

## 2025-03-27 LAB — BACTERIA SPEC CULT: NORMAL

## 2025-03-27 NOTE — PROGRESS NOTES
HPI - Marlin Cabral is a 39 year old female who is here for their second postoperative visit, status post endoscopic sinus surgery (RIGHT MAXILLARY ANTROSTOMY) on 03/21/25.      They report much improved congestion, no facial pressure, and no bloody drainage. Nasal saline rinses have been going well and there is no longer discolored or bloody drainage in the rinse.    Physical Exam  General - The patient is in no distress.  Alert, answers questions and cooperates with examination appropriately.   Nose - I first examined the nose with my headlight and nasal speculum. The septum was straight. No synechia. I can see into the middle meatus on both sides, it is clean. No polyps. No pus. Next I performed endoscopic nasal and sinus exam, see below.     PROCEDURE - ENDOSCOPIC SINUS EXAM    Nasal exam performed with a flexible fiberoptic endoscope for purposes of endoscopic sinus exam. I began by spraying the right side with lidocaine and neosynephrine.  Color photographs were taken for the medical record.  I began on the right side.  The middle meatus was patent some crust. I suctioned this with a #7. I advanced the scope into the right middle meatus. I was then able to visualize the right maxillary antrostomy, it is healing well and open.  No polyps or purulence noted.  I then moved further into the posterior nasal cavity. Superior meatus and sphenoethmoid recess was normal. Again no polyps or pus.     A/P -     ICD-10-CM    1. S/P FESS (functional endoscopic sinus surgery)  Z98.890 NASAL ENDOSCOPY, DIAGNOSTIC      2. PND (post-nasal drip)  R09.82           Marlin Cabral is a 39 year old female is doing well from sinus surgery (right maxillary antrostomy).  There are no signs of complications or infection.  Patient instructed to continue saline rinses daily or as needed. She has a h/o gagging and the thought was this was due to laryngopharyngeal reflux. She is better on PPIs (nexium) and stopping NSAIDS. Also has  postnasal drainage. If this persists she can try an antihistamine like zyrtec or allegra. We can also consider allergy testing. Return prn.

## 2025-04-03 LAB — BACTERIA SPEC CULT: NORMAL

## 2025-04-10 ENCOUNTER — OFFICE VISIT (OUTPATIENT)
Dept: OTOLARYNGOLOGY | Facility: CLINIC | Age: 40
End: 2025-04-10
Payer: COMMERCIAL

## 2025-04-10 VITALS — DIASTOLIC BLOOD PRESSURE: 83 MMHG | HEART RATE: 62 BPM | OXYGEN SATURATION: 97 % | SYSTOLIC BLOOD PRESSURE: 119 MMHG

## 2025-04-10 DIAGNOSIS — Z98.890 S/P FESS (FUNCTIONAL ENDOSCOPIC SINUS SURGERY): Primary | ICD-10-CM

## 2025-04-10 DIAGNOSIS — R09.82 PND (POST-NASAL DRIP): ICD-10-CM

## 2025-04-10 LAB — BACTERIA SPEC CULT: NORMAL

## 2025-04-10 NOTE — LETTER
4/10/2025      Marlin Cabral  [address] [facility] [address]      Dear Colleague,    Thank you for referring your patient, Marlin Cabral, to the [facility]. Please see a copy of my visit note below.    HPI - Marlin Cabral is a 39 year old female who is here for their second postoperative visit, status post endoscopic sinus surgery (RIGHT MAXILLARY ANTROSTOMY) on 03/21/25.      They report much improved congestion, no facial pressure, and no bloody drainage. Nasal saline rinses have been going well and there is no longer discolored or bloody drainage in the rinse.    Physical Exam  General - The patient is in no distress.  Alert, answers questions and cooperates with examination appropriately.   Nose - I first examined the nose with my headlight and nasal speculum. The septum was straight. No synechia. I can see into the middle meatus on both sides, it is clean. No polyps. No pus. Next I performed endoscopic nasal and sinus exam, see below.     PROCEDURE - ENDOSCOPIC SINUS EXAM    Nasal exam performed with a flexible fiberoptic endoscope for purposes of endoscopic sinus exam. I began by spraying the right side with lidocaine and neosynephrine.  Color photographs were taken for the medical record.  I began on the right side.  The middle meatus was patent some crust. I suctioned this with a #7. I advanced the scope into the right middle meatus. I was then able to visualize the right maxillary antrostomy, it is healing well and open.  No polyps or purulence noted.  I then moved further into the posterior nasal cavity. Superior meatus and sphenoethmoid recess was normal. Again no polyps or pus.     A/P -     ICD-10-CM    1. S/P FESS (functional endoscopic sinus surgery)  Z98.890 NASAL ENDOSCOPY, DIAGNOSTIC      2. PND (post-nasal drip)  R09.82           Marlin Cabral is a 39 year old female is doing well from sinus surgery (right maxillary antrostomy).  There are no  signs of complications or infection.  Patient instructed to continue saline rinses daily or as needed. She has a h/o gagging and the thought was this was due to laryngopharyngeal reflux. She is better on PPIs (nexium) and stopping NSAIDS. Also has postnasal drainage. If this persists she can try an antihistamine like zyrtec or allegra. We can also consider allergy testing. Return prn.         Again, thank you for allowing me to participate in the care of your patient.        Sincerely,        Dragan Cameron MD    Electronically signed

## 2025-04-17 LAB — BACTERIA SPEC CULT: NORMAL

## 2025-05-21 ENCOUNTER — PATIENT OUTREACH (OUTPATIENT)
Dept: CARE COORDINATION | Facility: CLINIC | Age: 40
End: 2025-05-21
Payer: COMMERCIAL

## 2025-06-04 ENCOUNTER — PATIENT OUTREACH (OUTPATIENT)
Dept: CARE COORDINATION | Facility: CLINIC | Age: 40
End: 2025-06-04
Payer: COMMERCIAL

## 2025-08-03 ENCOUNTER — HEALTH MAINTENANCE LETTER (OUTPATIENT)
Age: 40
End: 2025-08-03

## (undated) DEVICE — ENDO SHEATH STORZ SHARPSITE ENDOSCRUB 0DEG 4MM 1912000

## (undated) DEVICE — ANTIFOG SOLUTION W/FOAM PAD 31142527

## (undated) DEVICE — GOWN IMPERVIOUS BREATHABLE SMART XLG 89045

## (undated) DEVICE — SOL NACL 0.9% INJ 1000ML BAG 2B1324X

## (undated) DEVICE — SPONGE NEURO 1/2X3 WECK 200104

## (undated) DEVICE — SYRINGE EAR/ULCER STERILE 2 OZ BULB 4172

## (undated) DEVICE — TUBING IRRIGATOR STRAIGHTSHOT XPS 1895522

## (undated) DEVICE — GLOVE BIOGEL PI ULTRATOUCH G SZ 7.5 42175

## (undated) DEVICE — CUSTOM PACK MINOR SBA5BMNHEA

## (undated) DEVICE — Device

## (undated) DEVICE — BLADE TURBINATE INFERIOR 2MM ROTATE  1882040HR

## (undated) DEVICE — DRAPE U SPLIT 74X120" 29440

## (undated) DEVICE — VIAL DECANTER STERILE WHITE DYNJDEC06

## (undated) DEVICE — CONTAINER URINE SPEC 4OZ STRL 1053

## (undated) DEVICE — TUBING SUCTION MEDI-VAC 1/4"X20' N620A

## (undated) DEVICE — SOL WATER IRRIG 1000ML BOTTLE 2F7114

## (undated) RX ORDER — LIDOCAINE HYDROCHLORIDE 10 MG/ML
INJECTION, SOLUTION EPIDURAL; INFILTRATION; INTRACAUDAL; PERINEURAL
Status: DISPENSED
Start: 2025-03-21

## (undated) RX ORDER — DEXAMETHASONE SODIUM PHOSPHATE 10 MG/ML
INJECTION, SOLUTION INTRAMUSCULAR; INTRAVENOUS
Status: DISPENSED
Start: 2025-03-21

## (undated) RX ORDER — PROPOFOL 10 MG/ML
INJECTION, EMULSION INTRAVENOUS
Status: DISPENSED
Start: 2025-03-21

## (undated) RX ORDER — LIDOCAINE HYDROCHLORIDE AND EPINEPHRINE 10; 10 MG/ML; UG/ML
INJECTION, SOLUTION INFILTRATION; PERINEURAL
Status: DISPENSED
Start: 2025-03-21

## (undated) RX ORDER — OXYMETAZOLINE HYDROCHLORIDE 0.05 G/100ML
SPRAY NASAL
Status: DISPENSED
Start: 2025-03-21

## (undated) RX ORDER — FENTANYL CITRATE 50 UG/ML
INJECTION, SOLUTION INTRAMUSCULAR; INTRAVENOUS
Status: DISPENSED
Start: 2025-03-21

## (undated) RX ORDER — ONDANSETRON 2 MG/ML
INJECTION INTRAMUSCULAR; INTRAVENOUS
Status: DISPENSED
Start: 2025-03-21